# Patient Record
Sex: FEMALE | Race: WHITE
[De-identification: names, ages, dates, MRNs, and addresses within clinical notes are randomized per-mention and may not be internally consistent; named-entity substitution may affect disease eponyms.]

---

## 2019-11-11 ENCOUNTER — HOSPITAL ENCOUNTER (OUTPATIENT)
Dept: HOSPITAL 25 - ED | Age: 74
Setting detail: OBSERVATION
LOS: 2 days | Discharge: HOME | End: 2019-11-13
Attending: INTERNAL MEDICINE | Admitting: INTERNAL MEDICINE
Payer: COMMERCIAL

## 2019-11-11 DIAGNOSIS — M25.551: ICD-10-CM

## 2019-11-11 DIAGNOSIS — E03.9: ICD-10-CM

## 2019-11-11 DIAGNOSIS — Z88.2: ICD-10-CM

## 2019-11-11 DIAGNOSIS — E87.1: ICD-10-CM

## 2019-11-11 DIAGNOSIS — Z79.899: ICD-10-CM

## 2019-11-11 DIAGNOSIS — M85.80: ICD-10-CM

## 2019-11-11 DIAGNOSIS — Z79.01: ICD-10-CM

## 2019-11-11 DIAGNOSIS — Z96.641: ICD-10-CM
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
## 2019-11-11 DIAGNOSIS — S32.591A: Primary | ICD-10-CM

## 2019-11-11 DIAGNOSIS — Z86.711: ICD-10-CM

## 2019-11-11 DIAGNOSIS — F32.9: ICD-10-CM
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
## 2019-11-11 PROCEDURE — 96376 TX/PRO/DX INJ SAME DRUG ADON: CPT

## 2019-11-11 PROCEDURE — 85025 COMPLETE CBC W/AUTO DIFF WBC: CPT

## 2019-11-11 PROCEDURE — 96375 TX/PRO/DX INJ NEW DRUG ADDON: CPT

## 2019-11-11 PROCEDURE — 72170 X-RAY EXAM OF PELVIS: CPT

## 2019-11-11 PROCEDURE — 96374 THER/PROPH/DIAG INJ IV PUSH: CPT

## 2019-11-11 PROCEDURE — 36415 COLL VENOUS BLD VENIPUNCTURE: CPT

## 2019-11-11 PROCEDURE — 99284 EMERGENCY DEPT VISIT MOD MDM: CPT

## 2019-11-11 PROCEDURE — 96372 THER/PROPH/DIAG INJ SC/IM: CPT

## 2019-11-11 PROCEDURE — G0378 HOSPITAL OBSERVATION PER HR: HCPCS

## 2019-11-11 PROCEDURE — 80053 COMPREHEN METABOLIC PANEL: CPT

## 2019-11-11 RX ADMIN — IBUPROFEN SCH MG: 600 TABLET, FILM COATED ORAL at 18:55

## 2019-11-11 RX ADMIN — OXYCODONE HYDROCHLORIDE AND ACETAMINOPHEN PRN TAB: 5; 325 TABLET ORAL at 20:24

## 2019-11-11 RX ADMIN — HYDROMORPHONE HYDROCHLORIDE PRN MG: 1 INJECTION, SOLUTION INTRAMUSCULAR; INTRAVENOUS; SUBCUTANEOUS at 22:45

## 2019-11-11 RX ADMIN — ENOXAPARIN SODIUM SCH MG: 40 INJECTION SUBCUTANEOUS at 18:56

## 2019-11-11 NOTE — ED
Lower Extremity





- HPI Summary


HPI Summary: 





Pt is a 75 y/o F presenting to the ED with a chief complaint of R hip pain. Pt 

states she was in an exercise class when she fell and hurt her R hip. Notable 

hx of fracture to R hip, and R hip replacement done by Dr. Man in [place]. 

She currently reports pain. She denies fever.





- History of Current Complaint


Chief Complaint: EDExtremityLower


Stated Complaint: RT HIP PAIN AFTER FALL PER EMS


Time Seen by Provider: 11/11/19 10:30


Hx Obtained From: Patient


Mechanism Of Injury: Fall From A Standing Position


Onset of Pain: Immediate


Onset/Duration: Still Present


Severity Initially: Moderate


Severity Currently: Moderate


Pain Intensity: 6


Pain Scale Used: 0-10 Numeric


Timing: Constant, Lasting Hours


Location: Is Discrete @ - R hip


Associated Signs And Symptoms: Positive: Negative


Aggravating Factor(s): Movement


Alleviating Factor(s): Nothing


Able to Bear Weight: No





- Allergies/Home Medications


Allergies/Adverse Reactions: 


 Allergies











Allergy/AdvReac Type Severity Reaction Status Date / Time


 


Sulfa (Sulfonamide Allergy  Rash Verified 10/11/19 11:10





Antibiotics)     











Home Medications: 


 Home Medications





Cholecalciferol TAB* [Vitamin D TAB*] 1,000 unit PO DAILY 11/11/19 [History 

Confirmed 11/11/19]


Dextromethorphan Polistirex [Delsym] 30 mg PO DAILY PRN 11/11/19 [History 

Confirmed 11/11/19]


Phytonadione (Vit K1) [Superiorsource K1] 500 mcg PO DAILY 11/11/19 [History 

Confirmed 11/11/19]











PMH/Surg Hx/FS Hx/Imm Hx


Previously Healthy: Yes


Endocrine/Hematology History: Reports: Hx Thyroid Disease - hashimotos


   Denies: Hx Diabetes


Cardiovascular History: 


   Denies: Hx Hypertension, Hx Pacemaker/ICD, Other Cardiovascular Problems/

Disorders - DENIES


Respiratory History: 


   Denies: Hx Asthma, Hx Chronic Obstructive Pulmonary Disease (COPD), Other 

Respiratory Problems/Disorders - DENIES


GI History: 


   Denies: Hx Ulcer


 History: 


   Denies: Hx Renal Disease


Musculoskeletal History: 


   Denies: Hx Rheumatoid Arthritis, Hx Osteoporosis - OSTEOPENIA


Sensory History: 


   Denies: Hx Hearing Aid


Neurological History: Reports: Hx Migraine


Psychiatric History: 


   Denies: Hx Panic Disorder





- Cancer History


Hx Chemotherapy: No


Hx Radiation Therapy: No





- Surgical History


Surgery Procedure, Year, and Place: partial thyroidectomy 1995.  bone cyst 

removed x3.  rt hip replacement 3/11/16.  C SECTION X2.  BREAST BIOPSY


Infectious Disease History: No


Infectious Disease History: 


   Denies: Hx Hepatitis, Hx Human Immunodeficiency Virus (HIV), History Other 

Infectious Disease, Traveled Outside the [place] in Last 30 Days





- Family History


Known Family History: 


   Negative: Hypertension





- Social History


Alcohol Use: Daily


Alcohol Amount: 1 glass of wine - none since March r/t Xarelto


Hx Substance Use: No


Substance Use Type: Reports: None


Hx Tobacco Use: No


Smoking Status (MU): Never Smoked Tobacco





Review of Systems


Negative: Fever


Positive: Arthralgia - R hip, Other - fall


All Other Systems Reviewed And Are Negative: Yes





Physical Exam





- Summary


Physical Exam Summary: 





Appearance: The patient is well-nourished in no acute distress and in no acute 

pain.


 


Skin: The skin is warm and dry, and skin color reflects adequate perfusion.





HEENT: The head is normocephalic and atraumatic. The pupils are equal and 

reactive. The conjunctivae are clear and without drainage. Nares are patent and 

without drainage. Mouth reveals moist mucous membranes, and the throat is 

without erythema and exudate. The external ears are intact. The ear canals are 

patent and without drainage. The tympanic membranes are intact.


 


Neck: The neck is supple with full range of motion and non-tender. There are no 

carotid bruits. There is no neck vein distension.


 


Respiratory: Chest is non-tender. Lungs are clear to auscultation and breath 

sounds are symmetrical and equal.


 


Cardiovascular: Heart is regular rate and rhythm. There is no murmur or rub 

auscultated. There is no peripheral edema and pulses are symmetrical and equal.


 


Abdomen: The abdomen is soft and non-tender. There are normal bowel sounds 

heard in all four quadrants and there is no organomegaly palpated.


 


Musculoskeletal: There is no back tenderness noted. Tenderness to any ROM of R 

hip. There is good capillary refill. There is no peripheral edema or calf 

tenderness elicited.


 


Neurological: Patient is alert and oriented to person, place and time. The 

patient has symmetrical motor strength in all four extremities. Cranial nerves 

are grossly intact. Deep tendon reflexes are symmetrical and equal in all four 

extremities.


 


Psychiatric: The patient has an appropriate affect and does not exhibit any 

anxiety or depression.








Triage Information Reviewed: Yes


Vital Signs On Initial Exam: 


 Initial Vitals











Temp Pulse Resp BP Pulse Ox


 


 97.5 F   73   18   133/81   99 


 


 11/11/19 10:26  11/11/19 10:26  11/11/19 10:26  11/11/19 10:26  11/11/19 10:26











Vital Signs Reviewed: Yes





Procedures





- Sedation


Patient Received Moderate/Deep Sedation with Procedure: No





Diagnostics





- Vital Signs


 Vital Signs











  Temp Pulse Resp BP Pulse Ox


 


 11/11/19 10:26  97.5 F  73  18  133/81  99














- Laboratory


Lab Statement: Any lab studies that have been ordered have been reviewed, and 

results considered in the medical decision making process.





- Radiology


  ** Hip/Pelvis XR


Radiology Interpretation Completed By: Radiologist


Summary of Radiographic Findings: 1. COMMINUTED DISPLACED FRACTURE OF THE RIGHT 

SUPERIOR PUBIC RAMUS.  2. POSSIBLE NONDISPLACED FRACTURE OF THE RIGHT INFERIOR 

PUBIC RAMUS.  3. STATUS POST TOTAL RIGHT HIP REPLACEMENT SURGERY, NO EVIDENCE 

FOR PERIPROSTHETIC FRACTURE.  ED physician has reviewed this report.





Lower Extremity Course/Dx





- Course


Course Of Treatment: Ms. Geller was found to have a nondisplaced superior 

ramus fracture and a nondisplaced inferior ramus fracture of the right side of 

her pelvis.  In spite of parenteral pain medication I was unable to get her to 

ambulate and therefore contacted the hospitalist for admission for intractable 

pain.





- Diagnoses


Provider Diagnoses: 


 Pelvic fracture








Discharge ED





- Sign-Out/Discharge


Documenting (check all that apply): Patient Departure





- Discharge Plan


Condition: Stable


Disposition: ADMITTED TO [facility]





- Billing Disposition and Condition


Condition: STABLE


Disposition: Admitted to [facility]





- Attestation Statements


Document Initiated by Scribe: Yes


Documenting Scribe: Shae Hawthorne


Provider For Whom Joaquin is Documenting (Include Credential): Bruce Madera MD.


Scribe Attestation: 


I, Shae Hawthorne, scribed for Bruce Madera MD. on 11/11/19 at 2002. 


Scribe Documentation Reviewed: Yes


Provider Attestation: 


The documentation as recorded by the scribe, Shae Hawthorne accurately 

reflects the service I personally performed and the decisions made by me, 

Bruce Madera MD.


Status of Scribe Document: Viewed

## 2019-11-11 NOTE — XMS REPORT
Continuity of Care Document (CCD)

 Created on:2019



Patient:Melinda Geller

Sex:Female

:1945

External Reference #:[id number]





Demographics







 Address  [address] [place] [address]

 

 Home Phone  [phone]

 

 Email Address  [email or web address]

 

 Preferred Language  en

 

 Marital Status  Declined to Specify/Unknown

 

 Alevism Affiliation  Unknown

 

 Race  White

 

 Ethnic Group  Declined to Specify/Unknown









Author







 Name  Val Rodriguez, JOSE

 

 Address  [address] [place] [address]









Care Team Providers







 Name  Role  Phone

 

 Cryer, Jonathan MD - Otolaryngology  Care Team Information   [phone]
[phone]

 

 Collin Carbajal MD - Family Medicine  Care Team Information   [phone]
[phone]









Problems







 Active Problems  Provider  Date

 

 Depressive disorder  Collin Carbajal M.D.  Onset: 12/15/2011

 

 Backache  Collin Carbajal M.D.  Onset: 12/15/2011

 

 Spinal stenosis in cervical region  Collin Carbajal M.D.  Onset: 2012

 

 Osteochondropathy  Collin Carbajal M.D.  Onset: 2012

 

 Goiter  Collin Carbajal M.D.  Onset: 2013

 

 Non-toxic multinodular goiter  Collin Carbajal M.D.  Onset: 02/15/2016

 

 Degenerative joint disease involving  Collin Carbajal M.D.  Onset: 02/15/2016



 multiple joints    

 

 Skin sensation disturbance  Collin Carbajal M.D.  Onset: 2019

 

 H/O: pulmonary embolus  Collin Carbajal M.D.  Onset: 2019

 

 Headache  Collin Carbajal M.D.  Onset: 2019







Social History







 Type  Date  Description  Comments

 

 Birth Sex    Unknown  

 

 Tobacco Use  Start: Unknown  Never Smoked Cigarettes  

 

 ETOH Use    Rarely consumes alcohol  

 

 Tobacco Use  Start: Unknown  Patient has never smoked  

 

 Smoking Status  Reviewed: 10/19/19  Patient has never smoked  







Allergies, Adverse Reactions, Alerts







 Active Allergies  Reaction  Severity  Comments  Date

 

 Seasonal        2011

 

 Sulfa      sores in mouth  2016









 Inactive Allergies









 NKDA        2011







Medications







 Active Medications  SIG  Qnty  Indications  Ordering  Date



         Provider  

 

 Doxycycline  take one tablet by  20caps  J01.90  Val Garcia  10/19/2019



 Monohydrate  mouth twice a day.      JOSE Rodriguez  



            100mg          



 Capsules          



           

 

 Zofran  take one tablet as  30tabs  R11.0  Val Radha  10/19/2019



       4mg Tablets  needed every 8 hours      JOSE Rodriguez  



   for nausea        

 

 Shingrix  one injection, repeat  1units    Collin SANTIZO  2019



         50mcg/0.5ML  in 2-6 months      REJI Carbajal  



 Suspension Rec          



           

 

 Prolia  Inject 60MG  1units    Collin SANTIZO  2017



       60mg/ml Soln  Subcutaneously Every      REJI Carbajal  



 Prefill Syringe  6 Months (Given AT        



   Prescribers Office)        

 

 Fluoxetine HCL  Take 1 Tablet By  90tabs    Collin SANTIZO  2016



               10mg  Mouth  Every Day      REJI Carbajal  



 Tablets          



           

 

 Triamcinolone  apply daily as needed  45gm    Collin SANTIZO  02/15/2016



 Acetonide        REJI Carbajal  



          0.1% Cream          



           

 

 Vitamin C  qd      Collin FRiddhi  2014



         REJI Carbajal  

 

 Vitamin B Complex  1 po qd      Unknown  



           



 Tablets          



           

 

 Vitamin D  2 by mouth every day      Unknown  



          1000Unit          



 Tablets          



           

 

 Ferrous Gluconate  1 by mouth every day      Unknown  



           



 225(27Fe) mg Tablets          



           

 

 Vitamin K        Unknown  



 (Phytonadione)          



               100mcg          



 Tablets          



           

 

 Calcium 600  1po every day      Unknown  



            600mg          



 Tablets          



           







Medications Administered in Office







 Medication  SIG  Qnty  Indications  Ordering Provider  Date

 

 Injection Subcutaneous Or        Collin Carbajal M.D.  2018



 Intramuscular          



     Injection          

 

 Injection Subcutaneous Or        Collin Carbajal M.D.  2018



 Intramuscular          



     Injection          

 

 Injection Subcutaneous Or        Collin Carbajal M.D.  2018



 Intramuscular          



     Injection          







Immunizations







 CPT Code  Status  Date  Vaccine  Reaction  Lot #

 

 [id number]  Given  09/10/2019  Zoster (Shingles) Vaccine (HZV),    [id number]



       Recombinant, Subunit, Adjuvanted    

 

 53441  Given  2016  Hep A Adlt Immunization    [id number]

 

 96575  Given  08/10/2015  Pneumococcal Conjugate Vacc-13    [id number]

 

 37232  Given  2013  DO Not Use Split Influenza Virus    



       Vaccine    

 

 58384  Given  2012  Pneumococcal Immunization  no reaction noted  [id number]

 

 22174  Given  2012  Tdap Tetanus, W Pertussis  no reaction noted  [id number]

 

 22552  Given  2009  DO Not Use Split Influenza Virus    [id number]



       Vaccine    

 

 27347  Given  2008  Tetanus And Diptheria Adult    [id number]



       Preservative Free >7Yrs    

 

 28209  Given  2007  Zostivax    1212F

 

 15670  Given  1998  Td Immunization, For Use In    



       Individuals 7 Years Or Older    

 

 80588  Given  10/27/1997  Influenza Immunization    







Vital Signs







 Date  Vital  Result  Comment

 

 10/19/2019 12:03pm  BP Systolic  100 mmHg  









 BP Diastolic  66 mmHg  

 

 Heart Rate  104 /min  

 

 Body Temperature  98.5 F  

 

 Respiratory Rate  16 /min  

 

 O2 % BldC Oximetry  95 %  Ra

 

 Height  66 inches  5'6" measured 19

 

 Weight  145.00 lb  

 

 BMI (Body Mass Index)  23.4 kg/m2  









 2019 10:25am  BP Systolic  98 mmHg  









 BP Diastolic  60 mmHg  

 

 Heart Rate  62 /min  

 

 Body Temperature  97.9 F  

 

 Respiratory Rate  16 /min  

 

 Height  66 inches  5'6" measured 19

 

 Weight  145.00 lb  

 

 BMI (Body Mass Index)  23.4 kg/m2  







Results







 Test  Date  Facility  Test  Result  H/L  Range  Note

 

 Ict-Hemoccult  2019  [facility]  Ict Hemoccult  19 Neg.      



 ([facility]    (607)-   -  (1)        



 Screeni              









 Ict Hemoccult-(2)  19 Neg.      

 

 Ict-Hemoccult (3)  19 Neg.      









 Laboratory test  2019  [facility]  C-Reactive  0.8 mg/L    0.0-4.9  1



 finding    [address]  Protein, [place]        



     [place] [address]          



     (607)-   -          

 

 Ua - Micro ([facility])  2019  [facility]  Appearance  Clear      



     (607)-   -          









 Color  Yellow      

 

 Glucose, Urine ([facility]/CTX)  Negative      

 

 Bilirubin  Negative      

 

 Ketones  Negative      

 

 SP Grav  1.010      

 

 Blood  Moderate      

 

 PH  7.0      

 

 Protein  Negative      

 

 Urobil  0.2      

 

 Nitrite  Negative      

 

 Leukocytes ([facility]rex)  Small      

 

 Hyaline  - /Lpf      

 

 Granular  - /Lpf      

 

 WBC ([facility])  5-10      

 

 RBC  3-5      

 

 Mucus ([facility]/CBC/Centrex)  - /Lpf      

 

 Epith  Rare /Lpf      

 

 Bacteria  Rare /Hpf      

 

 Amorphous ([facility]rex)  - /Lpf      

 

 Crystals, Fluid ([facility]/CTX)  -      

 

 Z#Comments  -      









 1  1 gold top SST







Procedures







 Date  Code  Description  Status

 

 2019  58160  Electrocardiogram Complete  Completed

 

 2019  49134401  Mammogram  Completed

 

 2018  18581707  Mammogram  Completed

 

 2017  540659861  Bone Mineral Density Test  Completed

 

 2017  90715849  Mammogram  Completed

 

 2016  94351026  Mammogram  Completed

 

 2015  80593644  Mammogram  Completed

 

 2014  21473714  Colonoscopy  Completed

 

 2014  83059054  Mammogram  Completed

 

 2012  70331841  Mammogram  Completed

 

 10/20/2011  72722496  Mammogram  Completed

 

 2010  01611901  Mammogram  Completed

 

 2009  40154470  Mammogram  Completed

 

 2008  40933109  Mammogram  Completed

 

 2007  02208271  Mammogram  Completed

 

 2006  12556800  Mammogram  Completed







Medical Devices







 Description

 

 No Information Available







Encounters







 Type  Date  Location  Provider  Dx  Diagnosis

 

 Office Visit  2019 10:20a  [facility] Office  Collin Carbajal M.D.  R51
  Headache









 R20.1  Hypoesthesia of skin

 

 M85.9  Disorder of bone density and structure, unspecified







Assessments







 Date  Code  Description  Provider

 

 10/19/2019  R11.0  Nausea  Val Rodriguez, JOSE

 

 10/19/2019  J01.90  Acute sinusitis, unspecified  Val Rodriguez NP

 

 09/10/2019  Z23  Encounter for immunization  Collin Carbajal M.D.

 

 2019  R51  Headache  Collin Carbajal M.D.

 

 2019  R20.1  Hypoesthesia of skin  Collin Carbajal M.D.

 

 2019  M85.9  Disorder of bone density and structure,  Collin Carbajal M.D.



     unspecified  

 

 2019  Z12.11  Encounter for screening for malignant  Collin Carbajal M.D.



     neoplasm of colon  

 

 2019  Z12.12  Encounter for screening for malignant  Collin Carbajal M.D.



     neoplasm of rectum  

 

 2019  R51  Headache  Collin Carbajal M.D.

 

 2019  M85.9  Disorder of bone density and structure,  Collin Carbajal M.D.



     unspecified  

 

 2019  E04.2  Nontoxic multinodular goiter  Collin Carbajal M.D.

 

 2019  Z86.711  Personal history of pulmonary embolism  Collin Carbajal M.D.

 

 2019  F32.89  Other specified depressive episodes  Collin Carbajal M.D.

 

 2019  Z00.00  Encounter for general adult medical  Collin Carbajal M.D.



     examination without abno  







Plan of Treatment

10/19/2019 - Val Rodriguez, NPR11.0 NauseaNew Medication:Zofran 4 mg - 
take one tablet as needed every 8 hours for nauseaComments:bland diet slow PO 
fluids mrwjgrgbkK60.90 Acute sinusitis, unspecifiedNew Medication:Doxycycline 
Monohydrate 100 mg - take one tablet by mouth twice a day.Comments:Supportive 
care: 1) Make sure you are resting. This is the only way the body can take the 
energy it needs to heal itself. 2) Fluids, fluids, fluids! - Drink a lot of 
water or other caffeine free, clearliquids - Use a humidifier in your room at 
night or perform steam inhalations (20-30 mins) three times a day- If tolerated
, use a saline nasal spray to help clear out your sinuses 3) Cough and blow it 
out, the more you can get out of your system the better4) For throat pain: try 
using an adult dose ofchildren's Tylenol to help coat your throat and give you 
some pain relief. 5) Sleep with the head ofthe bed up6) Avoid cigarette smoke, 
caffeine, alcoholIf you are not improving or begin to get worse,please contact 
the office to be seen again.AllComments:Medication Management Patient 
Understands medications  he 's taking?     Yes    No  Are there Barriers to 
Adherence?    Yes    No  Has the patient been asked about herbal supplements 
and therapies, andOTC  meds?      Yes    No      Care Plan1.  Patient has been 
queried about patient's goals/preferences and functional/lifestyle goals at 
relevant visits.  If relevant, describe: na2.  Treatment goals as explained to 
the patient: above3.  Are there barriers to meeting treatment goals?  Yes    No
      If Yes, please describe: disease process, comorbid conditions 4.  Self-
Management goals as described to the patient:  Yes     NoAs always, we strongly 
encourage a healthy diet and making physical activity a part of your every day 
life. If you have questions about how or where to start, please contact the 
office.



Functional Status







 Description

 

 No Information Available







Mental Status







 Description

 

 No Information Available







Referrals







 Description

 

 No Information Available

## 2019-11-11 NOTE — HP
History of Present Illness





- History of Present Illness


Reason for Visit: Pain after Fall


History of Present Illness: 





This is 73 y/o F with history of provoked PE and Osteopenia presented with Pain 

in her right hip that started after she fell down this morning while 

exercising. She was apparently well until this morning while she was exercising 

and doing warm up and her legs jammed and she felt down on her right side and 

since then she is reporting of right hip pain that is acute in onset, 

progressive, 10/10 when it started and deep aching type. It was increased by 

movement and decreased by rest and medication. It is associated with decreased 

mobility of her right hip. She denies trauma to other site, bleeding and head 

trauma. She denies loss of conscious, numbness, tingling and weakness and 

incontinence. She denies fever, chills and rigor. She denies history of low 

trauma fracture in past, kidney stones or family history of kidney stones. She 

does report of losing 1 inch of her height. Because of progressive pain it 

prompted her to come to ED.





In ED


Her vitals were stable but she was having worsening pain. She was given Pain 

meds and pelvic Xray was done which showed displaced fracture of right superior 

pubic ramus and posible nondisplaced fracture of right inferior pubic ramus; s/

p total right hip replacement. Her pain improved and she was planned to 

discharge but then her pain increased on movement and had lightheadedness, 

dizziness and nausea so she was admitted for management of Pain and fracture.





- Past Medical History


Past Medical History: 





1. Pulmonary Embolism- Provoked after hip replacement surgery 3 years ago; 

completed 6 month course of Xarelto


2. Osteopenia- Next DEXA scan in December


3. Depression





- Past Surgical History


Past Surgical History: 





1. Right hip replacement-03/11/2016


2. Partial Thyroidectomy


3. Left 5th finger fracture- 8 years ago.





- Past Family History


Past Family History: 





Has 2 children and both of them are healthy. 





- Past Social History


Past Social History: 





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 She drinks 1 glass 

of wine/beer per day frequently and denies smoking and drug use. Her PCP is 

Collin Carbajal. Her HCP is Regalado Ferdinand, her ([phone]) and she is full 

code.





Review of Systems





- Review of Systems


Constitutional: Negative: Fever, Chills, Sweats, Weakness, Malaise, Other


Eyes: Negative: Pain, Vision Change, Conjunctivae Inflammation, Eyelid 

Inflammation, Redness, Other


ENT: Positive: Nose Congestion.  Negative: Ear Pain, Ear Discharge, Nose Pain, 

Nose Discharge, Mouth Pain, Mouth Swelling, Throat Pain, Throat Swelling, Other


Respiratory: Negative: Cough, Dry, Shortness of Breath, Hemoptysis, SOB with 

Excertion, Pleuritic Pain, Sputum, Wheezing


Cardiovascular: Negative: Chest Pain, Palpitations, Orthopnea, Paroxysmal Noc. 

Dyspnea, Edema, Light Headedness, Other


Gastrointestinal: Negative: Nausea, Vomiting, Abdominal Pain, Diarrhea, 

Constipation, Melena, Hematochezia, Other


Genitourinary: Negative: Dysuria, Frequency, Incontinence, Hematuria, Retention

, Other


Musculoskeletal: Positive: Other - hip pain.  Negative: Neck Pain, Shoulder Pain

, Arm Pain, Back Pain, Hand Pain, Leg Pain, Foot Pain


Skin: Negative: Rash, Lesions, Jose J, Bruising, Other


Neurological: Negative: Weakness, Numbness, Incoordination, Change in Speech, 

Confusion, Seizures, Other





- Medications/Allergies


Allergies/Adverse Reactions: 


 Allergies











Allergy/AdvReac Type Severity Reaction Status Date / Time


 


Sulfa (Sulfonamide Allergy  Rash Verified 10/11/19 11:10





Antibiotics)     











Medications: 


 Current Medications





Ascorbic Acid (Vitamin C  Tab*)  500 mg PO DAILY [facility]


Cholecalciferol (Vitamin D Tab*)  1,000 units PO DAILY [facility]


Enoxaparin Sodium (Lovenox(*))  40 mg SUBCUT Q24H [facility]


Fluoxetine HCl (Fluoxetine Hcl)  10 mg PO BEDTIME [facility]


Hydromorphone HCl (Dilaudid Inj*)  0.5 mg IV Q4H PRN


   PRN Reason: PAIN - SEVERE


Ibuprofen (Motrin Tab*)  600 mg PO Q6H [facility]


Non-Formulary Medication (Calcium Carbonate/Vitamin D3 [Calcium 500 + Vit D 

Caplet])  1 tab PO DAILY [facility]


Non-Formulary Medication (Ferrous Gluconate [Ferrous Gluconate])  240 mg PO 

DAILY [facility]


Non-Formulary Medication (Phytonadione (Vit K1) [Vitamin K-1])  500 mcg PO 

DAILY [facility]


Oxycodone/Acetaminophen (Percocet 5/325 Tab*)  1 tab PO Q4H PRN


   PRN Reason: PAIN - MODERATE











Exam


Vital Signs: 


 Vital Signs (72 hours)











  11/11/19 11/11/19 11/11/19





  10:26 10:28 10:56


 


Temperature 97.5 F  


 


Pulse Rate 71 69 68


 


Respiratory 18  





Rate   


 


Blood Pressure 133/81  156/78





(mmHg)   


 


O2 Sat by Pulse 99 99 99





Oximetry   














  11/11/19 11/11/19 11/11/19





  11:00 11:56 12:00


 


Temperature   


 


Pulse Rate 65 70 67


 


Respiratory   





Rate   


 


Blood Pressure  142/70 





(mmHg)   


 


O2 Sat by Pulse 99 98 97





Oximetry   














  11/11/19 11/11/19 11/11/19





  12:26 12:49 12:56


 


Temperature   


 


Pulse Rate 75  69


 


Respiratory  17 





Rate   


 


Blood Pressure 128/69  149/70





(mmHg)   


 


O2 Sat by Pulse 98  97





Oximetry   














  11/11/19 11/11/19 11/11/19





  13:00 14:14 14:59


 


Temperature   


 


Pulse Rate 70  


 


Respiratory  18 





Rate   


 


Blood Pressure   97/54





(mmHg)   


 


O2 Sat by Pulse 98  





Oximetry   














  11/11/19 11/11/19 11/11/19





  15:26 15:56 16:26


 


Temperature   


 


Pulse Rate   


 


Respiratory   





Rate   


 


Blood Pressure 117/60 125/61 133/64





(mmHg)   


 


O2 Sat by Pulse   





Oximetry   














  11/11/19





  16:56


 


Temperature 


 


Pulse Rate 


 


Respiratory 





Rate 


 


Blood Pressure 123/62





(mmHg) 


 


O2 Sat by Pulse 





Oximetry 











Exam: 





Patient is lying on a bed in supine position and is not in acute dstress.


HEENT: nOrmocephalic and atraumatic. Sclera anicteric. EOMI. PERRLA.


Neck: No lymphadenopathy and enlarged thyroid. NO JVD elevation.


Lungs: Clear with no added sound.


Heart: S1/S2 heard with no murmur.


Abdomen: Soft, nondistended and nontender. Normal BS heard.


Extremities; NO evidence of bleeding or bruising. Active ROM decreased by pain 

on right hip.


Neuro: Alert, oriented and coperative. CN intact. Motor nomral and sensation 

intact.





Assessment/Plan





- Assessment/Plan


Assessment: 





73 y/o F with h/o Right hip replacement and provoked PE presented with right 

hip pain after mechanical fall. She is found to have displaced fracture of 

right superior pubic ramus. Pain controlled on Ibuprofen, percocet and 

hydromorphone.





Plan: 





1. Right Hip Pain- From pubic fracture. Could be also be from ligament sprain 

or muscle strain. We will control her pain with NSAIDS, percocet and 

hydromorphone. WE will consult Orthopedic for pelvic fracture and also for 

prosthesis check. We will keep her on bed rest and ask PT to evaluate her.





2. Osteopenia- Her last DEXA scan showed osteopenia and is on Vitamin D and 

calcium. He rnect DEXA is scheduled on december.





3. Depression- COntinue Fluoxetine. No active issue.





4. DVT prophylaxis- On lovenox





5. Diet- Regular unrestricted diet.





6 Full Code

## 2019-11-12 LAB
ALBUMIN SERPL BCG-MCNC: 3.9 G/DL (ref 3.2–5.2)
ALBUMIN/GLOB SERPL: 1.6 {RATIO} (ref 1–3)
ALP SERPL-CCNC: 49 U/L (ref 34–104)
ALT SERPL W P-5'-P-CCNC: 17 U/L (ref 7–52)
ANION GAP SERPL CALC-SCNC: 4 MMOL/L (ref 2–11)
AST SERPL-CCNC: 26 U/L (ref 13–39)
BASOPHILS # BLD AUTO: 0 10^3/UL (ref 0–0.2)
BUN SERPL-MCNC: 12 MG/DL (ref 6–24)
BUN/CREAT SERPL: 20.3 (ref 8–20)
CALCIUM SERPL-MCNC: 9 MG/DL (ref 8.6–10.3)
CHLORIDE SERPL-SCNC: 94 MMOL/L (ref 101–111)
EOSINOPHIL # BLD AUTO: 0.1 10^3/UL (ref 0–0.6)
GLOBULIN SER CALC-MCNC: 2.5 G/DL (ref 2–4)
GLUCOSE SERPL-MCNC: 100 MG/DL (ref 70–100)
HCO3 SERPL-SCNC: 30 MMOL/L (ref 22–32)
HCT VFR BLD AUTO: 35 % (ref 35–47)
HGB BLD-MCNC: 11.9 G/DL (ref 12–16)
LYMPHOCYTES # BLD AUTO: 0.8 10^3/UL (ref 1–4.8)
MCH RBC QN AUTO: 33 PG (ref 27–31)
MCHC RBC AUTO-ENTMCNC: 34 G/DL (ref 31–36)
MCV RBC AUTO: 97 FL (ref 80–97)
MONOCYTES # BLD AUTO: 0.5 10^3/UL (ref 0–0.8)
NEUTROPHILS # BLD AUTO: 4.3 10^3/UL (ref 1.5–7.7)
NRBC # BLD AUTO: 0 10^3/UL
NRBC BLD QL AUTO: 0
PLATELET # BLD AUTO: 208 10^3/UL (ref 150–450)
POTASSIUM SERPL-SCNC: 3.6 MMOL/L (ref 3.5–5)
PROT SERPL-MCNC: 6.4 G/DL (ref 6.4–8.9)
RBC # BLD AUTO: 3.59 10^6 /UL (ref 3.7–4.87)
SODIUM SERPL-SCNC: 128 MMOL/L (ref 135–145)
WBC # BLD AUTO: 5.8 10^3/UL (ref 3.5–10.8)

## 2019-11-12 RX ADMIN — OXYCODONE HYDROCHLORIDE AND ACETAMINOPHEN SCH MG: 500 TABLET ORAL at 09:50

## 2019-11-12 RX ADMIN — Medication SCH UNITS: at 09:50

## 2019-11-12 RX ADMIN — IBUPROFEN SCH: 600 TABLET, FILM COATED ORAL at 00:34

## 2019-11-12 RX ADMIN — ENOXAPARIN SODIUM SCH MG: 40 INJECTION SUBCUTANEOUS at 17:23

## 2019-11-12 RX ADMIN — Medication SCH TAB: at 09:50

## 2019-11-12 RX ADMIN — IBUPROFEN SCH: 600 TABLET, FILM COATED ORAL at 12:20

## 2019-11-12 RX ADMIN — HYDROMORPHONE HYDROCHLORIDE PRN MG: 1 INJECTION, SOLUTION INTRAMUSCULAR; INTRAVENOUS; SUBCUTANEOUS at 03:06

## 2019-11-12 RX ADMIN — ONDANSETRON PRN MG: 2 INJECTION INTRAMUSCULAR; INTRAVENOUS at 13:09

## 2019-11-12 RX ADMIN — HYDROMORPHONE HYDROCHLORIDE PRN MG: 1 INJECTION, SOLUTION INTRAMUSCULAR; INTRAVENOUS; SUBCUTANEOUS at 07:03

## 2019-11-12 RX ADMIN — Medication SCH: at 08:51

## 2019-11-12 RX ADMIN — NAPROXEN SCH MG: 250 TABLET ORAL at 20:40

## 2019-11-12 RX ADMIN — FLUOXETINE SCH MG: 10 CAPSULE ORAL at 08:50

## 2019-11-12 RX ADMIN — OXYCODONE HYDROCHLORIDE AND ACETAMINOPHEN PRN TAB: 5; 325 TABLET ORAL at 23:31

## 2019-11-12 RX ADMIN — CYCLOBENZAPRINE HYDROCHLORIDE PRN MG: 10 TABLET, FILM COATED ORAL at 23:02

## 2019-11-12 RX ADMIN — HYDROMORPHONE HYDROCHLORIDE PRN MG: 1 INJECTION, SOLUTION INTRAMUSCULAR; INTRAVENOUS; SUBCUTANEOUS at 11:14

## 2019-11-12 RX ADMIN — IBUPROFEN SCH: 600 TABLET, FILM COATED ORAL at 07:52

## 2019-11-12 RX ADMIN — OXYCODONE HYDROCHLORIDE AND ACETAMINOPHEN PRN TAB: 5; 325 TABLET ORAL at 09:50

## 2019-11-12 RX ADMIN — ONDANSETRON PRN MG: 2 INJECTION INTRAMUSCULAR; INTRAVENOUS at 21:40

## 2019-11-12 NOTE — HP
H&P (Free Text)


History and Physical: 


Orthopedic Surgery Consultation H&P


Date: 11/12/19


Requesting Service: ER, Dr. Madera





Chief Complaint: Right hip/pelvic pain.   





History:  A 74-year-old female who was in a cardio class and sustained a 

mechanical fall onto her right side.  She had immediate pain in the right hip/

pelvic region.  Denies pain anywhere else.  Denies head strike or LOC.  She 

reports that she had a right total hip replacement about 3 years ago and has 

had no problems with that since then.  She does have a history of a inferior 

pubic ramus fracture.  At the time of the total hip replacement.





The pain is located at the right hip/pelvic area and is constant, mild, mild, 

sharp. Pain worse with weightbearing and lessened when rested. 


   


Review of Systems: Negative for fever, recent visual changes, difficulty 

swallowing, chest pain, shortness of breath, abdominal pain, hematuria, easy 

bruising, diffuse weakness or lack of coordination, and diffuse rash. 





PMH: Pulmonary embolism after hip replacement, osteopenia, depression





PSH: Right total hip replacement in 2016, partial thyroidectomy, left hand 

surgery





Medications: Fluoxetine, vitamins





Allergies: Sulfa drugs





SH: She is retired.  She lives with her .  One alcoholic drink per day.  

No tobacco use.





FH: Noncontributory





Physical Examination:


Constitutional: 


 








Temp Pulse Resp BP Pulse Ox


 


 97.9 F   73   16   121/63   91 


 


 11/12/19 11:10  11/12/19 11:10  11/12/19 11:14  11/12/19 11:10  11/12/19 11:10








General appearance is healthy and non-septic in no acute distress.





Cardiovascular: 


Pulse examination demonstrates positive pedal pulses with brisk capillary 

refill. There are no varicosities.





Abdomen:


Soft and nontender





Lymphatic: 


No lymphadenopathy appreciated.





Skin: 


Bilateral upper and lower extremity examination demonstrates no ulcerative 

lesions.





Psychiatric / Neurological: 


Appropriate affect. Alert and oriented to person, place and time. There is no 

significant abnormality in coordination appreciated. Normoreflexive deep tendon 

reflex of the affected extremity.





Musculoskeletal: 


Bilateral upper extremities and contralateral lower extremity show full range 

of motion with no evidence of instability and no tenderness with palpation and 5

/5 strength. There is no gross deformity.


 


Right lower extremity: 


Skin intact


Tenderness to palpation about the anterior pubis.  No tenderness about the 

lateral hip.


Painless hip, knee and ankle range of motion.


5/5 motor strength distally with intact sensation to light touch


There is no global swelling, edema, or varicosities. 


No pain with logroll and heel strike


No TTP at knee, lower leg, ankle, foot


Palpable DP pulse.


Flexes and extends ankle and toes.





Imaging: X-rays were obtained, and independently interpreted and show a right 

pubic rami fractures





Labs: WBC 5.8


HCT 35


Platelets 208


Cr 0.59





Impression and Plan: A 74-year-old female status post mechanical fall with right

-sided pubic rami fractures.  There is no evidence of problem with her right 

total hip replacement.  I did discuss the diagnosis and treatment with her and 

her .  The plan will be mobilization with PT, weightbearing as 

tolerated.  Once ambulating, please repeat AP pelvis x-ray to confirm no 

displacement.  Recommend DVT prophylaxis, given her history of a PE.  She can 

followup in 2 weeks in orthopedic clinic for repeat evaluation.  All questions 

were answered.





Giorgio Mane MD

## 2019-11-12 NOTE — PN
Subjective


Date of Service: 11/12/19


Interval History: 





HD 2 on 11/12





73 y/o F with h/o Right hip replacement and provoked PE presented with right 

hip pain after mechanical fall. She is found to have displaced fracture of 

right superior pubic ramus. Pain controlled on Ibuprofen, percocet and 

hydromorphone.





Overnight- no acute events


VS stable





Pain on right hip 2/10 while resting and 6-8/10 while moving


Denies numbness and tingling


Had episode of vomiting








Objective


Active Medications: 








Ascorbic Acid (Vitamin C  Tab*)  [facility] mg PO [facility]ILY [facility]


Calcium/Vitamin D (Oscal D Tab 250/125*)  1 tab PO DAILY [facility]


Cholecalciferol (Vitamin D Tab*)  1,000 units PO DAILY [facility]


Enoxaparin Sodium (Lovenox(*))  40 mg SUBCUT Q24H [facility]


   Last Admin: 11/11/19 18:56 Dose:  40 mg


Fluoxetine HCl (Prozac Cap*)  10 mg PO 0900 [facility]


Hydromorphone HCl (Dilaudid Inj*)  0.5 mg IV Q4H PRN


   PRN Reason: PAIN - SEVERE


   Last Admin: 11/12/19 03:06 Dose:  0.5 mg


Ibuprofen (Motrin Tab*)  600 mg PO Q6H [facility]


   Last Admin: 11/12/19 00:34 Dose:  Not Given


Nf:(Ferrous Gluconate [Ferrous Gluconate] 240 Mg)  240 mg PO DAILY [facility]


Oxycodone/Acetaminophen (Percocet 5/325 Tab*)  1 tab PO Q4H PRN


   PRN Reason: PAIN - MODERATE


   Last Admin: 11/11/19 20:24 Dose:  1 tab








 Vital Signs - 8 hr











  11/11/19 11/11/19 11/11/19





  22:42 22:45 23:45


 


Temperature 98.3 F  


 


Pulse Rate 74  


 


Respiratory 20 24 16





Rate   


 


Blood Pressure 116/50  





(mmHg)   


 


O2 Sat by Pulse 93  





Oximetry   














  11/12/19 11/12/19 11/12/19





  03:06 03:40 04:06


 


Temperature  98.1 F 


 


Pulse Rate  70 


 


Respiratory 14 20 20





Rate   


 


Blood Pressure  120/67 





(mmHg)   


 


O2 Sat by Pulse  93 





Oximetry   











Oxygen Devices in Use Now: None


Exam: 





Patient is lying on a bed in supine position and is not in acute dstress.


HEENT: nOrmocephalic and atraumatic. Sclera anicteric. EOMI. PERRLA.


Neck: No lymphadenopathy and enlarged thyroid. NO JVD elevation.


Lungs: Clear with no added sound.


Heart: S1/S2 heard with no murmur.


Abdomen: Soft, nondistended and nontender. Normal BS heard.


Extremities; NO evidence of bleeding or bruising. Active ROM decreased by pain 

on right hip.


Neuro: Alert, oriented and coperative. CN intact. Motor nomral and sensation 

intact.


Result Diagrams: 


 11/12/19 08:16





 11/12/19 08:16





Assess/Plan/Problems-Billing


Assessment: 





73 y/o F with h/o Right hip replacement and provoked PE presented with right 

hip pain after mechanical fall. She is found to have displaced fracture of 

right superior pubic ramus. Pain controlled on Ibuprofen, percocet and 

hydromorphone.








- Patient Problems


(1) Pubic ramus fracture


Current Visit: Yes   Status: Acute   Code(s): S32.599A - OTH FRACTURE OF UNSP 

PUBIS, INIT ENCNTR FOR CLOSED FRACTURE   SNOMED Code(s): 15319108


   Comment: 


-new superior ramus # on right after low trauma fall


-old inferior ramus #- during hip replacement surgery


-ortho consulation- recommens ambulation with PT and repeat pelvic Xray


-Ambulated with PT


-on naproxen and percocet


   





(2) Osteopenia


Current Visit: Yes   Status: Acute   Code(s): M85.80 - OTH DISRD OF BONE 

DENSITY AND STRUCTURE, UNSPECIFIED SITE   SNOMED Code(s): 726588607


   Comment: 


-On vitamin D and calcium


-DEXA on december   





(3) Depression


Current Visit: Yes   Status: Acute   Code(s): F32.9 - MAJOR DEPRESSIVE DISORDER

, SINGLE EPISODE, UNSPECIFIED   SNOMED Code(s): 24519984


   Comment: 


Continue fluoxetine


-no active issues   





(4) DVT prophylaxis


Current Visit: Yes   Status: Acute   Code(s): Z29.9 - ENCOUNTER FOR 

PROPHYLACTIC MEASURES, UNSPECIFIED   SNOMED Code(s): 398704698


   Comment: 


-on lovenox   





(5) Full code status


Current Visit: Yes   Status: Acute   Code(s): Z78.9 - OTHER SPECIFIED HEALTH 

STATUS   SNOMED Code(s): 367829716


   


Status and Disposition: 





Observation


dc tomorrow


Attending: Padmini Govea





Attestation


Documenting Resident: Marcos Poe


Supervising Physician: Padmini Govea


Attending/Supervising Physician Comment: 


attending assessment and plan:


74F PMH R THR 3 yr ago, osteopenia, mild anxiety presented s/p mech fall with 

superior ramus frxr. Admitted for pain control. Hospital course c/b side effect 

from opiate pain medicatoin


Plan


#Fracture: Was weight bearing today, optimize pain control on NSAID PRN percocet

, stop IV pain meds


-Continue PT


-Repeat Pelvic XR 11/13


#Osteopenia: Has DEXA scheduled in Dec 


#THR R: No e/o hardware fracture


#DVT: PPX with Lovenox


Dispo-Home vs [facility] depending on pt progress, OBS status


Attestation: 


This service has been performed in part by a resident under the direction of a 

teaching physician.I, Padmini Govea, performed the service, or was physically 

present during the critical, or key portions of the service, furnished by the 

resident. I participated in the management of the patient.

## 2019-11-13 VITALS — SYSTOLIC BLOOD PRESSURE: 92 MMHG | DIASTOLIC BLOOD PRESSURE: 69 MMHG

## 2019-11-13 RX ADMIN — FLUOXETINE SCH MG: 10 CAPSULE ORAL at 10:03

## 2019-11-13 RX ADMIN — NAPROXEN SCH MG: 250 TABLET ORAL at 10:03

## 2019-11-13 RX ADMIN — Medication SCH: at 09:59

## 2019-11-13 RX ADMIN — OXYCODONE HYDROCHLORIDE AND ACETAMINOPHEN SCH MG: 500 TABLET ORAL at 10:03

## 2019-11-13 RX ADMIN — OXYCODONE HYDROCHLORIDE AND ACETAMINOPHEN PRN TAB: 5; 325 TABLET ORAL at 13:00

## 2019-11-13 RX ADMIN — OXYCODONE HYDROCHLORIDE AND ACETAMINOPHEN PRN TAB: 5; 325 TABLET ORAL at 10:04

## 2019-11-13 RX ADMIN — Medication SCH TAB: at 10:03

## 2019-11-13 RX ADMIN — CYCLOBENZAPRINE HYDROCHLORIDE PRN MG: 10 TABLET, FILM COATED ORAL at 15:02

## 2019-11-13 RX ADMIN — Medication SCH UNITS: at 10:03

## 2019-11-13 NOTE — PN
Subjective


Date of Service: 11/13/19


Interval History: 








HD 3 on 11/13





75 y/o F with h/o Right hip replacement and provoked PE presented with right 

hip pain after mechanical fall. She is found to have displaced fracture of 

right superior pubic ramus. Pain controlled on Naproxen and percocet





Overnight- urine retention- seo inserted


Vital stable





Feeling good; discomfort on right hip while resting and pain 8/10 on ambulation


She ambulated well today 


Has some nausea but denies vomiting, fever.








Objective


Active Medications: 








Ascorbic Acid (Vitamin C  Tab*)  500 mg PO DAILY [facility]


   Last Admin: 11/12/19 09:50 Dose:  500 mg


Calcium/Vitamin D (Oscal D Tab 250/125*)  1 tab PO DAILY [facility]


   Last Admin: 11/12/19 09:50 Dose:  1 tab


Cholecalciferol (Vitamin D Tab*)  1,000 units PO DAILY [facility]


   Last Admin: 11/12/19 09:50 Dose:  1,000 units


Cyclobenzaprine HCl (Flexeril Tab*)  10 mg PO BID PRN


   PRN Reason: SPASMS


   Last Admin: 11/12/19 23:02 Dose:  10 mg


Enoxaparin Sodium (Lovenox(*))  40 mg SUBCUT Q24H [facility]


   Last Admin: 11/12/19 17:23 Dose:  40 mg


Fluoxetine HCl (Prozac Cap*)  10 mg PO 0900 [facility]


   Last Admin: 11/12/19 08:50 Dose:  10 mg


Naproxen (Naprosyn Tab*)  500 mg PO 0900,2100 [facility]


   Last Admin: 11/12/19 20:40 Dose:  500 mg


Nf:(Ferrous Gluconate [Ferrous Gluconate] 240 Mg)  240 mg PO DAILY [facility]


   Last Admin: 11/12/19 08:51 Dose:  Not Given


Ondansetron HCl (Zofran Inj*)  4 mg IV Q6H PRN


   PRN Reason: NAUSEA


   Last Admin: 11/12/19 21:40 Dose:  4 mg


Oxycodone/Acetaminophen (Percocet 5/325 Tab*)  1 tab PO Q4H PRN


   PRN Reason: PAIN - MODERATE


   Last Admin: 11/12/19 23:31 Dose:  1 tab








 Vital Signs - 8 hr











  11/12/19 11/12/19 11/12/19





  23:02 23:23 23:31


 


Temperature  98.3 F 


 


Pulse Rate  77 


 


Respiratory 18 18 18





Rate   


 


Blood Pressure  135/61 





(mmHg)   


 


O2 Sat by Pulse  92 





Oximetry   














  11/13/19 11/13/19 11/13/19





  01:39 02:01 03:09


 


Temperature   98.4 F


 


Pulse Rate   71


 


Respiratory 18 18 17





Rate   


 


Blood Pressure   115/56





(mmHg)   


 


O2 Sat by Pulse   95





Oximetry   














  11/13/19





  03:28


 


Temperature 


 


Pulse Rate 


 


Respiratory 18





Rate 


 


Blood Pressure 





(mmHg) 


 


O2 Sat by Pulse 





Oximetry 











Oxygen Devices in Use Now: None


Exam: 





Patient is lying on a bed in supine position and is not in acute dstress.


HEENT: nOrmocephalic and atraumatic. Sclera anicteric. EOMI. PERRLA.


Neck: No lymphadenopathy and enlarged thyroid. NO JVD elevation.


Lungs: Clear with no added sound.


Heart: S1/S2 heard with no murmur.


Abdomen: Soft, nondistended and nontender. Normal BS heard.


Extremities; NO evidence of bleeding or bruising. Active ROM decreased by pain 

on right hip.


Neuro: Alert, oriented and coperative. CN intact. Motor nomral and sensation 

intact.


Result Diagrams: 


 11/12/19 08:16





 11/12/19 08:16





Assess/Plan/Problems-Billing


Assessment: 





75 y/o F with h/o Right hip replacement and provoked PE presented with right 

hip pain after mechanical fall. She is found to have displaced fracture of 

right superior pubic ramus. Pain controlled on Ibuprofen and percocet.








- Patient Problems


(1) Pubic ramus fracture


Current Visit: Yes   Status: Acute   Code(s): S32.599A - OTH FRACTURE OF UNSP 

PUBIS, INIT ENCNTR FOR CLOSED FRACTURE   SNOMED Code(s): 91229668


   Comment: 


-new superior ramus # on right after low trauma fall


-old inferior ramus #- during hip replacement surgery


-Ambulated with PT


-on naproxen and percocet


repeat xray shows displaced # same as previous one


-dc today


   





(2) Osteopenia


Current Visit: Yes   Status: Acute   Code(s): M85.80 - OTH DISRD OF BONE 

DENSITY AND STRUCTURE, UNSPECIFIED SITE   SNOMED Code(s): 848294657


   Comment: 


-On vitamin D and calcium


-DEXA on december   





(3) Depression


Current Visit: Yes   Status: Acute   Code(s): F32.9 - MAJOR DEPRESSIVE DISORDER

, SINGLE EPISODE, UNSPECIFIED   SNOMED Code(s): 23056001


   Comment: 


Continue fluoxetine


-no active issues   





(4) Hyponatremia


Current Visit: Yes   Status: Acute   Code(s): E87.1 - HYPO-OSMOLALITY AND 

HYPONATREMIA   SNOMED Code(s): 03894335


   Comment: 


-Long standing, PCP to follow    





(5) DVT prophylaxis


Current Visit: Yes   Status: Acute   Code(s): Z29.9 - ENCOUNTER FOR 

PROPHYLACTIC MEASURES, UNSPECIFIED   SNOMED Code(s): 990088212


   Comment: 


-on lovenox   





(6) Full code status


Current Visit: Yes   Status: Acute   Code(s): Z78.9 - OTHER SPECIFIED HEALTH 

STATUS   SNOMED Code(s): 313290681


   


Status and Disposition: 





Observation


dc today; seo out


Attending: Padmini Govea





Attestation


Documenting Resident: Marcos Poe


Supervising Physician: Padmini Govea


Attending/Supervising Physician Comment: 





Addendum: Agree with resident note, stable for d/c with pain control and ortho 

follow up


Attestation: 


This service has been performed in part by a resident under the direction of a 

teaching physician.I, Padmini Govea, performed the service, or was physically 

present during the critical, or key portions of the service, furnished by the 

resident. I participated in the management of the patient.

## 2019-11-13 NOTE — DS
CC:  Dr. Collin Carbajal; Dr. Giorgio Mane

 

DISCHARGE SUMMARY:

 

DATE OF ADMISSION:  11/12/19

 

DATE OF DISCHARGE:  11/13/19

 

PRIMARY CARE PROVIDER:  Dr. Collin Carbajal.

 

DISPOSITION AT THE TIME OF DISCHARGE:  Discharge to home with home PT.

 

PRIMARY DIAGNOSIS:  Right-sided superior rami, nondisplaced fracture.

 

SECONDARY DIAGNOSES:

1.  Status post total right hip replacement in 2016.

2.  Osteopenia, possibly osteoporosis.

3.  Depression.

4.  History of provoked pulmonary embolism completed a 6 month course of Xarelto.

 

MEDICATIONS AT TIME OF DISCHARGE:

1.  Cyclobenzaprine 10 mg p.o. b.i.d. p.r.n. for 30 tabs for muscle spasm.

2.  Lidocaine patch 1 patch transdermally to right hip p.r.n. for pain.

3.  Naproxen 500 mg p.o. b.i.d. for additional 7 days status post discharge.

4.  Ondansetron 4 mg p.o. q.8 hours p.r.n.

5.  Oxycodone acetaminophen 5/325 one tab p.o. q.6 hours p.r.n.

6.  Vitamin D 1000 units p.o. daily.

7.  Os-Pavel 1 tab p.o. daily.

8.  Vitamin C 500 mg p.o. daily.

9.  Fluoxetine 10 mg p.o. q.h.s.

10.  Ferrous gluconate 240 mg p.o. daily.

 

HISTORY OF PRESENT ILLNESS AND HOSPITAL COURSE:  A 74-year-old female with above past medical history
 who presented on 11/12/19 after she fell during exercise class and noted to have pain and was nonwei
ghtbearing.  In the emergency room, she was found to have an x-ray, which showed a superior right non
displaced comminuted fracture and a small inferior ramus fracture that was old and thought to be asso
ciated with her prior right total hip arthroplasty.  She was actually slated to be discharged to home
 from the emergency room, although when she went to stand she found she could not bear weight and had
 extreme pain and thus the hospitalist team was asked to admit the patient for pain control and obser
vation.  Her hospital course by problem as follows.

 

1.  Superior ramus fracture.  The patient was able to ambulate after adequate pain control with PT an
d did well, a repeat pelvic x-ray was done after the patient ambulated that did not show any further 
displacement, is confirmed by Orthopedics and she was discharged on pain control including nonsteroid
al anti-inflammatories, a short course of opiate pain medications and home PT to continue encouraging
 gentle movement.  She was able to bear weight and void freely.

2.  Status total right hip.  The patient has been at excellent baseline function, exercise and drivin
g, doing well since her replacement 3 years ago.  She did have a complication of a PE from the surger
y although this is not the same risk and does not require anticoagulation.

3.  Depression.  Home medications were continued.

4.  DVT prophylaxis.  The patient was placed on Lovenox in the hospital.  On day of discharge the pat
aba is ambulating, tolerating diet, working with PT, amenable to go home, has good access to primary
 care and her  feels comfortable to be able to discharge her to home.

 

LABS AND STUDIES DONE DURING THIS HOSPITALIZATION:  Labs done in emergency room showed mild hyponatre
mars, which seems consistent with prior unchanged from past examination.  Hemoglobin 11.9, but otherwi
se unremarkable.  Imaging included a pelvic x-ray done 11/11/19 showed superior ramus fracture and a 
repeat pelvic x-ray after ambulation on 11/13/19 that showed no change.

 

CONSULTS DURING THIS HOSPITALIZATION:  Included Physical Therapy and Orthopedic Surgery and the patie
nt should follow up with Orthopedic Surgery in 1 to 2 weeks.

 

ITEMS TO FOLLOW UP ON STATUS POST DISCHARGE:

1.  Superior ramus fracture.  The patient needs ortho followup.  She has home PT and pain medications
 prescribed for a short period of time.

2.  Hyponatremia.  The patient had mild hyponatremia during this hospitalization. This appears chroni
c.  Consider urine studies if she has some low-lying reset [place].

 

TIME SPENT:  Forty five minutes was spent on the planning of this discharge with over half of that sp
ent directly at the bedside of the patient.  Plan of care was discussed with the patient's family wit
h no for further questions.  This discharge summary represents a ______ of 2 days of hospitalization,
 if there are any questions please do not hesitate to reach out and contact me directly my cell phone
 is [phone].  A physical exam was done on the day of discharge.

 

 [id number] #: [id number]

## 2019-11-14 ENCOUNTER — HOSPITAL ENCOUNTER (EMERGENCY)
Dept: HOSPITAL 25 - ED | Age: 74
Discharge: HOME | End: 2019-11-14
Payer: COMMERCIAL

## 2019-11-14 VITALS — DIASTOLIC BLOOD PRESSURE: 69 MMHG | SYSTOLIC BLOOD PRESSURE: 114 MMHG
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
## 2019-11-14 DIAGNOSIS — Z96.641: ICD-10-CM

## 2019-11-14 DIAGNOSIS — S32.591D: ICD-10-CM

## 2019-11-14 DIAGNOSIS — Z88.2: ICD-10-CM

## 2019-11-14 DIAGNOSIS — M25.551: Primary | ICD-10-CM

## 2019-11-14 PROCEDURE — 99283 EMERGENCY DEPT VISIT LOW MDM: CPT

## 2019-11-14 PROCEDURE — 72170 X-RAY EXAM OF PELVIS: CPT

## 2019-11-14 NOTE — ED
Lower Extremity





- HPI Summary


HPI Summary: 


Patient is a 73 y/o F w/ Hx of right hip replacement due to arthritis in March 2016 and nondisplaced fracture of the right-sided superior rami that occurred 11 /11/19 who presents to [facility] via EMS with chief complaint of right hip pain. 

Patient reports that she sustained her right pelvic fracture after she had 

fallen onto her right side in exercise class. She was evaluated at [facility] and 

was admitted due to difficulty with weight-bearing and hip pain. Patient 

received physical therapy and reports that she was better able to tolerate 

weight bearing and ambulation afterwards. The patient was discharged to home. 

However, she states that after discharge she continued to have pain and 

difficulty with ambulation with an exacerbation today, 11/14/19. She notes that 

certain positions and movements aggravate her pain. She has been taking her 

prescribed Percocet and Naproxen but states that she has not had any relief in 

pain with these medications. Last dosage of these medications was 1700 11/14/ 19. On triage, pain is rated 2/10. Home medications and allergies are reviewed. 








- History of Current Complaint


Stated Complaint: RT HIP PAIN PER EMS


Time Seen by Provider: 11/14/19 20:09


Hx Obtained From: Patient


Mechanism Of Injury: Fall From A Standing Position


Onset of Pain: Days, Prior to Arrival


Onset/Duration: Worse Since


Severity Currently: Mild


Pain Scale Used: 0-10 Numeric


Timing: Lasting Days


Location: Is Discrete @ - right pelvis


Associated Signs And Symptoms: Positive: Negative


Aggravating Factor(s): Ambulation, Movement, Weight Bearing, Stairs, Other - 

certain positions





- Allergies/Home Medications


Allergies/Adverse Reactions: 


 Allergies











Allergy/AdvReac Type Severity Reaction Status Date / Time


 


Sulfa (Sulfonamide Allergy  Rash Verified 11/14/19 20:19





Antibiotics)     














PMH/Surg Hx/FS Hx/Imm Hx


Endocrine/Hematology History: Reports: Hx Thyroid Disease - hashimotos


   Denies: Hx Diabetes


Cardiovascular History: 


   Denies: Hx Hypertension, Hx Pacemaker/ICD, Other Cardiovascular Problems/

Disorders - DENIES


Respiratory History: 


   Denies: Hx Asthma, Hx Chronic Obstructive Pulmonary Disease (COPD), Other 

Respiratory Problems/Disorders - DENIES


GI History: 


   Denies: Hx Ulcer


 History: 


   Denies: Hx Renal Disease


Musculoskeletal History: 


   Denies: Hx Rheumatoid Arthritis, Hx Osteoporosis - OSTEOPENIA


Sensory History: Reports: Hx Contacts or Glasses


   Denies: Hx Hearing Aid


Opthamlomology History: Reports: Hx Contacts or Glasses


Neurological History: Reports: Hx Migraine


Psychiatric History: 


   Denies: Hx Panic Disorder





- Cancer History


Hx Chemotherapy: No


Hx Radiation Therapy: No





- Surgical History


Surgery Procedure, Year, and Place: partial thyroidectomy 1995.  bone cyst 

removed x3.  rt hip replacement 3/11/16.  C SECTION X2.  BREAST BIOPSY


Infectious Disease History: 


   Denies: Hx Hepatitis, Hx Human Immunodeficiency Virus (HIV), History Other 

Infectious Disease





- Family History


Known Family History: 


   Negative: Hypertension





- Social History


Alcohol Use: Daily


Alcohol Amount: 1 glass of wine - none since March r/t Xarelto


Hx Substance Use: No


Substance Use Type: Reports: None


Hx Tobacco Use: No


Smoking Status (MU): Never Smoked Tobacco





Review of Systems


Negative: Fever - on vitals, temp is 98.4 F 


Musculoskeletal: Other - positive - right pelvic pain 


All Other Systems Reviewed And Are Negative: Yes





Physical Exam





- Summary


Physical Exam Summary: 


Appearance: Well-appearing, Well-nourished, lying in bed comfortable


Skin: Warm, dry, no obvious rash


Eyes: sclera anicteric, no conjunctival pallor


ENT: mucous membranes moist


Neck: deferred


Respiratory: No signs of respiratory distress


Cardiovascular: Appears well perfused, pulses are nml


Abdomen: deferred


Musculoskeletal: Patient has no bruising over the right hip. She has pain with 

ROM of right hip and with pelvic rock.  


Neurological: Awake and alert, mentation is normal, speech is fluent and 

appropriate


Psychiatric: affect is normal, does not appear anxious or depressed








Triage Information Reviewed: Yes


Vital Signs On Initial Exam: 





 Initial Vitals











Temp Pulse Resp BP Pulse Ox


 


 98.4 F   80   16   128/67   95 


 


 11/14/19 20:05  11/14/19 20:05  11/14/19 20:05  11/14/19 20:05  11/14/19 20:05








Vital Signs Reviewed: Yes





Procedures





- Sedation


Patient Received Moderate/Deep Sedation with Procedure: No





Diagnostics





- Laboratory


Lab Statement: Any lab studies that have been ordered have been reviewed, and 

results considered in the medical decision making process.





- Radiology


  ** RIGHT PELVIC X-RAY


Radiology Interpretation Completed By: ED Physician


Summary of Radiographic Findings: Stable pelvic fracture, pending official 

report.





Re-Evaluation





- Re-Evaluation


  ** First Eval


Re-Evaluation Time: 22:32


Comment: Attempted to ambulate with a walker. Patient had difficulty with this. 

She still reports some pain.  is present in room. Pain management and 

home care were discussed.





Lower Extremity Course/Dx





- Course


Course Of Treatment: Patient is a 73 y/o F w/ Hx of right hip replacement due 

to arthritis in March 2016 and nondisplaced fracture of the right-sided 

superior rami that occurred 11/11/19 who presents to [facility] via EMS with chief 

complaint of right hip pain. Patient reports that she sustained her right 

pelvic fracture after she had fallen onto her right side in exercise class. She 

was evaluated at [facility] and was admitted due to difficulty with weight-bearing 

and hip pain. Patient received physical therapy and reports that she was better 

able to tolerate weight bearing and ambulation afterwards. The patient was 

discharged to home. However, she states that after discharge she continued to 

have pain and difficulty with ambulation with an exacerbation today, 11/14/19. 

On exam, patient has no bruising over the right hip. She has pain with ROM of 

right hip and with pelvic rock. During ED course, patient received Percocet, 2 

tabs. Attempted to ambulate with a walker. Patient had difficulty with this. 

She still reports pain. Pain management and home care options were discussed. 

Patient is discharged to home and will follow up with PCP.





- Diagnoses


Provider Diagnoses: 


 Pelvis fracture, right








Discharge ED





- Sign-Out/Discharge


Documenting (check all that apply): Patient Departure - discharge 





- Discharge Plan


Condition: Stable


Disposition: HOME


Patient Education Materials:  Pelvic Fracture (ED)


Referrals: 


Collin Carbajal MD [Primary Care Provider] - 


Additional Instructions: 


I think you definitely need to increase the dosing of the oxycodone, at least 

in the near term until the fracture starts to knit back together. We are not 

shooting for 0 pain, but you do need to be able to get up and move about 

somewhat to prevent the complications of immobility. I would recommend taking 1 

every 4 hrs, and you can increase that to 2 every 4 hours if needed, as limited 

by side effects. These tend to be excess sedation and constipation. Contact 

your doctor tomorrow as you will likely need a new prescription by the end of 

the weekend.





- Billing Disposition and Condition


Condition: STABLE


Disposition: Home





- Attestation Statements


Document Initiated by Joaquin: Yes


Documenting Scribe: KRISHAN MUIR


Provider For Whom Joaquin is Documenting (Include Credential): KANE NUNEZ MD


Scribe Attestation: 


I, KRISHAN MUIR, scribed for KANE NUNEZ MD on 11/15/19 at 1842. 


Scribe Documentation Reviewed: Yes


Provider Attestation: 


The documentation as recorded by the scribeKRISHAN accurately reflects 

the service I personally performed and the decisions made by me, KANE NUNEZ MD


Status of Scribe Document: Viewed

## 2019-11-14 NOTE — XMS REPORT
Patient Summary Document

 Created on:2019



Patient:RADHA WATERS

Sex:Female

:1945

External Reference #:[id number]





Demographics







 Address  [address] [place], [place] [address]

 

 Home Phone  [phone]

 

 Preferred Language  English

 

 Marital Status  Unknown

 

 Restoration Affiliation  Unknown

 

 Race  Unknown

 

 Ethnic Group  Unknown









Author







 Organization  [facility]









Support







 Name  Relationship  Address  Phone

 

 MIKE SAINI, Unknown Name  [place]  [address] [place] [address]  [phone]



     [place] [id number]  









Care Team Providers







 Name  Role  Phone

 

 Unavailable  Unavailable  Unavailable









Problems

This patient has no known problems.



Allergies, Adverse Reactions, Alerts







 Allergy  Allergy  Status  Severity  Reaction(s)  Onset  Inactive  Treating  
Comments



 Name  Type        Date  Date  Clinician  

 

 Sulfa  Unknown  Active  Unknown  Reaction  2019    Interface  



 (Sulfonami        Unknown  -13      



 de                



 Antibiotic                



 s)                







Medications







 Ordered  Filled  Start  Stop  Current  Ordering  Indication  Dosage  Frequency
  Signature  Comments  Components



 Medication  Medication  Date  Date  Medication?  Clinician        (SIG)    



 Name  Name                    

 

 ferrous  ferrous      No  Unknown    Unknown  Unknown      



 gluconate  gluconate  3-26                  



 270 mg (27  270 mg (27                    



 mg iron)  mg iron)                    



 tablet  tablet                    

 

 Fluoxetine  Fluoxetine      No  Unknown    Unknown  Unknown      



 Hcl  Hcl  3-                  

 

 ascorbic  ascorbic  2019    Yes  Unknown    Unknown  Unknown      



 acid  acid  0                  



 (vitamin C)  (vitamin C)                    



 500 mg  500 mg                    



 tablet  tablet                    

 

 Calcium  Calcium  2019    Yes  Unknown    Unknown  Unknown      



 Carbonate/V  Carbonate/V  0                  



 itamin D3  itamin D3                    

 

 cholecalcif  cholecalcif  2019    Yes  Unknown    Unknown  Unknown      



 maynor  maynor                    



 (vitamin  (vitamin                    



 D3) 1,000  D3) 1,000                    



 unit (25  unit (25                    



 mcg) tablet  mcg) tablet                    

 

 oxyCODONE-a  oxyCODONE-a  2019    Yes  Unknown    Unknown  Unknown      



 cetaminophe  cetaminophe                    



 n 5 mg-325  n 5 mg-325                    



 mg tablet  mg tablet                    

 

 Dextrometho  Dextrometho  2019    Yes  Unknown    Unknown  Unknown      



 rphan  rphan                    



 Polistirex  Polistirex                    

 

 Phytonadion  Phytonadion  2019    Yes  Unknown    Unknown  Unknown      



 e (Vit K1)  e (Vit K1)                    







Vital Signs







 Vital Name  Observation Time  Observation Value  Comments

 

 SYSTOLIC mm[Hg]  2019 18:08:50  134 mm[Hg] mm[Hg]  Method: Sit

 

 DIASTOLIC mm[Hg]  2019 18:08:50  81 mm[Hg] mm[Hg]  Method: Sit

 

 PULSE  2019 18:08:50  79 /min /min  

 

 RESP RATE  2019 18:08:50  20 /min /min  

 

 TEMP  2019 18:08:50  97.3 [degF]  







Procedures

This patient has no known procedures.



Results







 Test Description  Test Time  Test Comments  Text Results  Atomic Results  
Result Comments









 Serum or plasma alanine  2019 08:16:00  Identifier 1742-6 Result  Unknown



 aminotransferase measurement    Time 2019 08:16:00  



 (enzymatic activity/volume)      









   Test Item  Value  Reference Range  Comments









 Serum or plasma alanine aminotransferase measurement  17 U/L  Unknown  Unknown
  F



 (enzymatic activity/volume) (test code = 1742-6)      



Ordering Physician UnknownSerum or plasma albumin/globulin mass [id number] 
08:16:00Identifier 1759-0 Result Time 2019 08:16:00Unknown





 Test Item  Value  Reference Range  Comments

 

 Serum or plasma albumin/globulin mass ratio  1.6   Unknown  Unknown  F



 (test code = 1759-0)      



Ordering Physician UnknownSerum or plasma calcium measurement (mass/volume) 08:16:00Identifier 94043-0 Result Time 2019 08:16:00Unknown





 Test Item  Value  Reference Range  Comments

 

 Serum or plasma calcium measurement  9.0 mg/dL  Unknown  Unknown  F



 (mass/volume) (test code = 18490-6)      



Ordering Physician UnknownSerum or plasma aspartate aminotransferase 
measurement (enzymatic activity/volume)2019 08:16:00Identifier 0-8 
Result Time 2019 08:16:00Unknown





 Test Item  Value  Reference Range  Comments

 

 Serum or plasma aspartate aminotransferase  26 U/L  Unknown  Unknown  F



 measurement (enzymatic activity/volume)      



 (test code = 1920-8)      



Ordering Physician UnknownSerum or plasma total bilirubin measurement (mass/
volume)2019 08:16:00Identifier - Result Time 2019 08:16:
00Unknown





 Test Item  Value  Reference Range  Comments

 

 Serum or plasma total bilirubin  0.80 mg/dL  Unknown  Unknown  F



 measurement (mass/volume) (test code =      



 -)      



Ordering Physician UnknownSerum or plasma carbon dioxide, total measurement (
moles/volume)2019 08:16:00Identifier 2028 Result Time 2019 08:16:
00Unknown





 Test Item  Value  Reference Range  Comments

 

 Serum or plasma carbon dioxide, total  30 mmol/L  Unknown  Unknown  F



 measurement (moles/volume) (test code =      



 -)      



Ordering Physician UnknownSerum or plasma chloride measurement (moles/volume)
2019 08:16:00Identifier 2075-0 Result Time 2019 08:16:00Unknown





 Test Item  Value  Reference Range  Comments

 

 Serum or plasma chloride measurement  94 mmol/L  Unknown  Unknown  F



 (moles/volume) (test code = 2075-0)      



Ordering Physician UnknownSerum or plasma creatinine measurement (mass/volume)
2019 08:16:00Identifier 2160-0 Result Time 2019 08:16:00Unknown





 Test Item  Value  Reference Range  Comments

 

 Serum or plasma creatinine measurement  0.59 mg/dL  Unknown  Unknown  F



 (mass/volume) (test code = 2160-0)      



Ordering Physician UnknownSerum glucose measurement (mass/volume)2019 08:
16:00Identifier 2345-7 Result Time 2019 08:16:00Unknown





 Test Item  Value  Reference Range  Comments

 

 Serum glucose measurement (mass/volume)  100 mg/dL  Unknown  Unknown  F



 (test code = 2345-7)      



Ordering Physician UnknownSerum or plasma potassium measurement (moles/volume)
2019 08:16:00Identifier 2823-3 Result Time 2019 08:16:00Unknown





 Test Item  Value  Reference Range  Comments

 

 Serum or plasma potassium measurement  3.6 mmol/L  Unknown  Unknown  F



 (moles/volume) (test code = 2823-3)      



Ordering Physician UnknownSerum total protein measurement (mass/volume) 08:16:00Identifier 2885-2 Result Time 2019 08:16:00Unknown





 Test Item  Value  Reference Range  Comments

 

 Serum total protein measurement  6.4 g/dL  Unknown  Unknown  F



 (mass/volume) (test code = 2885-2)      



Ordering Physician UnknownSerum or plasma sodium measurement (moles/volume) 08:16:00Identifier 2951-2 Result Time 2019 08:16:00Unknown





 Test Item  Value  Reference Range  Comments

 

 Serum or plasma sodium measurement  128 mmol/L  Unknown  Unknown  F



 (moles/volume) (test code = 2951-2)      



Ordering Physician UnknownSerum or plasma urea nitrogen measurement (mass/volume
)2019 08:16:00Identifier 3094-0 Result Time 2019 08:16:00Unknown





 Test Item  Value  Reference Range  Comments

 

 Serum or plasma urea nitrogen measurement  12 mg/dL  Unknown  Unknown  F



 (mass/volume) (test code = 3094-0)      



Ordering Physician UnknownSerum or plasma urea nitrogen/creatinine olmsk0877-58-
12 08:16:00Identifier 3097-3 Result Time 2019 08:16:00Unknown





 Test Item  Value  Reference Range  Comments

 

 Serum or plasma urea nitrogen/creatinine  20.3   Unknown  Unknown  F



 ratio (test code = 3097-3)      



Ordering Physician UnknownAutomated blood platelet mean volume gpzkowuwtrx6877-
11-12 08:16:00Identifier 48246-9 Result Time 2019 08:16:00Unknown





 Test Item  Value  Reference Range  Comments

 

 Automated blood platelet mean volume  7.8 fL  Unknown  Unknown  F



 measurement (test code = 57610-1)      



Ordering Physician UnknownSerum or plasma anion eyb3150-99-64 08:16:
00Identifier 33037-3 Result Time 2019 08:16:00Unknown





 Test Item  Value  Reference Range  Comments

 

 Serum or plasma anion gap (test code =  4 mmol/L  Unknown  Unknown  F



 33037-3)      



Ordering Physician UnknownAutomated blood leukocytes count corrected for 
nucleated erythrocytes (number/volume)2019 08:16:00Identifier 19111-7 
Result Time 2019 08:16:00Unknown





 Test Item  Value  Reference Range  Comments

 

 Automated blood leukocytes count  5.8 10^3/uL  Unknown  Unknown  F



 corrected for nucleated erythrocytes      



 (number/volume) (test code = 42132-9)      



Ordering Physician UnknownAutomated blood nucleated erythrocytes oywqgekod5334-
11-12 08:16:00Identifier 88040-4 Result Time 2019 08:16:00Unknown





 Test Item  Value  Reference Range  Comments

 

 Automated blood nucleated erythrocytes  0.0   Unknown  Unknown  F



 detection (test code = 42293-0)      



Ordering Physician UnknownAutomated blood hematocrit (percentage)2019 08:
16:00Identifier 4544-3 Result Time 2019 08:16:00Unknown





 Test Item  Value  Reference Range  Comments

 

 Automated blood hematocrit (percentage) (test  35 %  Unknown  Unknown  F



 code = 4544-3)      



Ordering Physician UnknownEstimated glomerular filtration rate (GFR) non-
 Uqrjoypz3790-68-73 08:16:00Identifier 48642-3 Result Time 2019 08:
16:00Unknown





 Test Item  Value  Reference Range  Comments

 

 Estimated glomerular filtration rate (GFR)  100.2   Unknown  Unknown  F



 non- (test code = 48642-3)      



Ordering Physician UnknownAutomated blood monocytes/100 tezhwhkhnx0732-68-96 08:
16:00Identifier 5905-5 Result Time 2019 08:16:00Unknown





 Test Item  Value  Reference Range  Comments

 

 Automated blood monocytes/100 leukocytes  9.4 %  Unknown  Unknown  F



 (test code = 5905-5)      



Ordering Physician UnknownSerum or plasma albumin measurement by bromocresol 
green (BCG) dye binding method (kd1429-10-60 08:16:00Identifier 15320-7 Result 
Time 2019 08:16:00Unknown





 Test Item  Value  Reference Range  Comments

 

 Serum or plasma albumin measurement by  3.9 g/dL  Unknown  Unknown  F



 bromocresol green (BCG) dye binding method      



 (ma (test code = 18785-0)      



Ordering Physician UnknownSerum or plasma alkaline phosphatase measurement (
enzymatic activity/volume)2019 08:16:00Identifier 6768-6 Result Time  08:16:00Unknown





 Test Item  Value  Reference Range  Comments

 

 Serum or plasma alkaline phosphatase  49 U/L  Unknown  Unknown  F



 measurement (enzymatic activity/volume)      



 (test code = 6768-6)      



Ordering Physician UnknownAutomated blood basophil count (number/volume) 08:16:00Identifier 704-7 Result Time 2019 08:16:00Unknown





 Test Item  Value  Reference Range  Comments

 

 Automated blood basophil count  0.0 10^3/ul  Unknown  Unknown  F



 (number/volume) (test code = 704-7)      



Ordering Physician UnknownAutomated blood basophils/100 rrvzevletr2423-07-62 08:
16:00Identifier 706-2 Result Time 2019 08:16:00Unknown





 Test Item  Value  Reference Range  Comments

 

 Automated blood basophils/100 leukocytes  0.4 %  Unknown  Unknown  F



 (test code = 706-2)      



Ordering Physician UnknownAutomated blood eosinophil count (number/volume) 08:16:00Identifier 711-2 Result Time 2019 08:16:00Unknown





 Test Item  Value  Reference Range  Comments

 

 Automated blood eosinophil count  0.1 10^3/ul  Unknown  Unknown  F



 (number/volume) (test code = 711-2)      



Ordering Physician UnknownAutomated blood eosinophils/100 rvmbsvfvvu1935-05-84 
08:16:00Identifier 713-8 Result Time 2019 08:16:00Unknown





 Test Item  Value  Reference Range  Comments

 

 Automated blood eosinophils/100 leukocytes  1.3 %  Unknown  Unknown  F



 (test code = 713-8)      



Ordering Physician UnknownBlood hemoglobin measurement (mass/volume)2019 
08:16:00Identifier 718-7 Result Time 2019 08:16:00Unknown





 Test Item  Value  Reference Range  Comments

 

 Blood hemoglobin measurement  11.9 g/dL  Unknown  Unknown  F



 (mass/volume) (test code = 718-7)      



Ordering Physician UnknownBlood lymphocytes automated count (number/volume) 08:16:00Identifier 731-0 Result Time 2019 08:16:00Unknown





 Test Item  Value  Reference Range  Comments

 

 Blood lymphocytes automated count  0.8 10^3/ul  Unknown  Unknown  F



 (number/volume) (test code = 731-0)      



Ordering Physician UnknownAutomated blood lymphocytes/100 vflkzfdweq8931-41-74 
08:16:00Identifier 736-9 Result Time 2019 08:16:00Unknown





 Test Item  Value  Reference Range  Comments

 

 Automated blood lymphocytes/100 leukocytes  13.9 %  Unknown  Unknown  F



 (test code = 736-9)      



Ordering Physician UnknownBlood monocytes automated count (number/volume)2019 08:16:00Identifier 742-7 Result Time 2019 08:16:00Unknown





 Test Item  Value  Reference Range  Comments

 

 Blood monocytes automated count  0.5 10^3/ul  Unknown  Unknown  F



 (number/volume) (test code = 742-7)      



Ordering Physician UnknownAutomated blood neutrophils/100 flrsovnoth7543-78-31 
08:16:00Identifier 770-8 Result Time 2019 08:16:00Unknown





 Test Item  Value  Reference Range  Comments

 

 Automated blood neutrophils/100 leukocytes  75.0 %  Unknown  Unknown  F



 (test code = 770-8)      



Ordering Physician UnknownBlood nucleated erythrocytes automated count (number/
volume)2019 08:16:00Identifier 771-6 Result Time 2019 08:16:
00Unknown





 Test Item  Value  Reference Range  Comments

 

 Blood nucleated erythrocytes automated  0.0 10^3/ul  Unknown  Unknown  F



 count (number/volume) (test code =      



 771-6)      



Ordering Physician UnknownAutomated blood platelet count (number/volume) 08:16:00Identifier 777-3 Result Time 2019 08:16:00Unknown





 Test Item  Value  Reference Range  Comments

 

 Automated blood platelet count  208 10^3/uL  Unknown  Unknown  F



 (number/volume) (test code = 777-3)      



Ordering Physician UnknownAutomated erythrocyte mean corpuscular hemoglobin (
mass per erythrocyte)2019 08:16:00Identifier 785-6 Result Time 2019 
08:16:00Unknown





 Test Item  Value  Reference Range  Comments

 

 Automated erythrocyte mean corpuscular  33 pg  Unknown  Unknown  F



 hemoglobin (mass per erythrocyte) (test code      



 = 785-6)      



Ordering Physician UnknownAutomated erythrocyte mean corpuscular hemoglobin 
concentration measurement (mass/ntz0423-74-16 08:16:00Identifier 786-4 Result 
Time 2019 08:16:00Unknown





 Test Item  Value  Reference Range  Comments

 

 Automated erythrocyte mean corpuscular  34 g/dL  Unknown  Unknown  F



 hemoglobin concentration measurement      



 (mass/vol (test code = 786-4)      



Ordering Physician UnknownAutomated erythrocyte mean corpuscular cwobgb2488-67-
12 08:16:00Identifier 787-2 Result Time 2019 08:16:00Unknown





 Test Item  Value  Reference Range  Comments

 

 Automated erythrocyte mean corpuscular volume  97 fL  Unknown  Unknown  F



 (test code = 787-2)      



Ordering Physician UnknownAutomated erythrocyte distribution width bykuf6429-03-
12 08:16:00Identifier 788-0 Result Time 2019 08:16:00Unknown





 Test Item  Value  Reference Range  Comments

 

 Automated erythrocyte distribution width ratio  14 %  Unknown  Unknown  F



 (test code = 788-0)      



Ordering Physician UnknownAutomated blood erythrocyte count (number/volume) 08:16:00Identifier 789-8 Result Time 2019 08:16:00Unknown





 Test Item  Value  Reference Range  Comments

 

 Automated blood erythrocyte count  3.59 10^6 /uL  Unknown  Unknown  F



 (number/volume) (test code = 789-8)      



Ordering Physician UnknownLymphocyte proliferation sycj6284-17-17 08:16:
00Identifier [id number] Result Time 2019 08:16:00Unknown





 Test Item  Value  Reference Range  Comments

 

 Lymphocyte proliferation test (test  4.3 10^3/ul  Unknown  Unknown  F



 code = EUM8136)      



Ordering Physician Unknown

## 2019-11-15 NOTE — DS
DISCHARGE SUMMARY:

 

DATE OF ADMISSION:

 

DATE OF DISCHARGE:

 

ADDENDUM:  This is an addendum to the discharge summary that was already 
completed and signed on 11/13/19.

 

DISPOSITION AT TIME OF THE DISCHARGE:  Stable, to discharged to home.

 

 [id number]/CPS #: [id number]

ALEJANDRO

## 2020-02-26 ENCOUNTER — HOSPITAL ENCOUNTER (EMERGENCY)
Dept: HOSPITAL 25 - ED | Age: 75
Discharge: HOME | End: 2020-02-26
Payer: COMMERCIAL

## 2020-02-26 VITALS — DIASTOLIC BLOOD PRESSURE: 61 MMHG | SYSTOLIC BLOOD PRESSURE: 129 MMHG

## 2020-02-26 DIAGNOSIS — R53.1: ICD-10-CM

## 2020-02-26 DIAGNOSIS — Z88.2: ICD-10-CM

## 2020-02-26 DIAGNOSIS — R31.29: ICD-10-CM

## 2020-02-26 DIAGNOSIS — Z96.641: ICD-10-CM

## 2020-02-26 DIAGNOSIS — R10.12: Primary | ICD-10-CM

## 2020-02-26 LAB
ALBUMIN SERPL BCG-MCNC: 4.7 G/DL (ref 3.2–5.2)
ALBUMIN/GLOB SERPL: 1.7 {RATIO} (ref 1–3)
ALP SERPL-CCNC: 107 U/L (ref 34–104)
ALT SERPL W P-5'-P-CCNC: 15 U/L (ref 7–52)
ANION GAP SERPL CALC-SCNC: 6 MMOL/L (ref 2–11)
AST SERPL-CCNC: 25 U/L (ref 13–39)
BASOPHILS # BLD AUTO: 0 10^3/UL (ref 0–0.2)
BUN SERPL-MCNC: 11 MG/DL (ref 6–24)
BUN/CREAT SERPL: 19 (ref 8–20)
CALCIUM SERPL-MCNC: 9.9 MG/DL (ref 8.6–10.3)
CHLORIDE SERPL-SCNC: 96 MMOL/L (ref 101–111)
EOSINOPHIL # BLD AUTO: 0.1 10^3/UL (ref 0–0.6)
GLOBULIN SER CALC-MCNC: 2.8 G/DL (ref 2–4)
GLUCOSE SERPL-MCNC: 63 MG/DL (ref 70–100)
HCO3 SERPL-SCNC: 30 MMOL/L (ref 22–32)
HCT VFR BLD AUTO: 37 % (ref 35–47)
HGB BLD-MCNC: 13.2 G/DL (ref 12–16)
LYMPHOCYTES # BLD AUTO: 0.8 10^3/UL (ref 1–4.8)
MCH RBC QN AUTO: 34 PG (ref 27–31)
MCHC RBC AUTO-ENTMCNC: 35 G/DL (ref 31–36)
MCV RBC AUTO: 95 FL (ref 80–97)
MONOCYTES # BLD AUTO: 0.5 10^3/UL (ref 0–0.8)
NEUTROPHILS # BLD AUTO: 3.1 10^3/UL (ref 1.5–7.7)
NRBC # BLD AUTO: 0 10^3/UL
NRBC BLD QL AUTO: 0
PLATELET # BLD AUTO: 254 10^3/UL (ref 150–450)
POTASSIUM SERPL-SCNC: 4.1 MMOL/L (ref 3.5–5)
PROT SERPL-MCNC: 7.5 G/DL (ref 6.4–8.9)
RBC # BLD AUTO: 3.93 10^6 /UL (ref 3.7–4.87)
RBC UR QL AUTO: (no result)
SODIUM SERPL-SCNC: 132 MMOL/L (ref 135–145)
WBC # BLD AUTO: 4.5 10^3/UL (ref 3.5–10.8)
WBC UR QL AUTO: (no result)

## 2020-02-26 PROCEDURE — 84484 ASSAY OF TROPONIN QUANT: CPT

## 2020-02-26 PROCEDURE — 36415 COLL VENOUS BLD VENIPUNCTURE: CPT

## 2020-02-26 PROCEDURE — 81003 URINALYSIS AUTO W/O SCOPE: CPT

## 2020-02-26 PROCEDURE — 86140 C-REACTIVE PROTEIN: CPT

## 2020-02-26 PROCEDURE — 71046 X-RAY EXAM CHEST 2 VIEWS: CPT

## 2020-02-26 PROCEDURE — 99283 EMERGENCY DEPT VISIT LOW MDM: CPT

## 2020-02-26 PROCEDURE — 85025 COMPLETE CBC W/AUTO DIFF WBC: CPT

## 2020-02-26 PROCEDURE — 87086 URINE CULTURE/COLONY COUNT: CPT

## 2020-02-26 PROCEDURE — 80053 COMPREHEN METABOLIC PANEL: CPT

## 2020-02-26 PROCEDURE — 74176 CT ABD & PELVIS W/O CONTRAST: CPT

## 2020-02-26 PROCEDURE — 93005 ELECTROCARDIOGRAM TRACING: CPT

## 2020-02-26 PROCEDURE — 81015 MICROSCOPIC EXAM OF URINE: CPT

## 2020-02-26 PROCEDURE — 83690 ASSAY OF LIPASE: CPT

## 2020-02-26 NOTE — XMS REPORT
Patient Summary Document

 Created on:2019



Patient:RADHA WATERS

Sex:Female

:1945

External Reference #:[id number]





Demographics







 Address  [address] [place], [place] [address]

 

 Home Phone  [phone]

 

 Preferred Language  English

 

 Marital Status  Unknown

 

 Orthodox Affiliation  Unknown

 

 Race  Unknown

 

 Ethnic Group  Unknown









Author







 Organization  [facility]









Support







 Name  Relationship  Address  Phone

 

 MIKE SAINI, Unknown Name  NextOfKin  [address] [place] [address]  [phone]



     [place] [id number]  









Care Team Providers







 Name  Role  Phone

 

 Unavailable  Unavailable  Unavailable









Problems







 Condition  Condition  Condition  Status  Onset  Resolution  Last  Treating  
Comments



 Name  Details  Category    Date  Date  Treatment  Clinician  



             Date    

 

 Unspecified  Unspecified  Diagnosis  Active  2019      Roberta  



 fracture of  fracture of      1-15      Taveras  



 right  right              



 pubis,  pubis,              



 subsequent  subsequent              



 encounter  encounter              



 for  for              



 fracture  fracture              



 with  with              



 routine  routine              



 healing  healing              

 

 Other  Other  Diagnosis  Active  2019      Roberta  



 specified  specified      1-15      Taveras  



 disorders  disorders              



 of bone  of bone              



 density and  density and              



 structure,  structure,              



 unspecified  unspecified              



 site  site              

 

 Major  Major  Diagnosis  Active  2019      Roberta  



 depressive  depressive      1-15      Taveras  



 disorder,  disorder,              



 recurrent,  recurrent,              



 unspecified  unspecified              

 

 Presence of  Presence of  Diagnosis  Active  2019      Roberta  



 right  right      1-15      [place]as  



 artificial  artificial              



 hip joint  hip joint              

 

 Safety  can be left  Safety  Resolve  2019    Amy  



   alone for    d  1-15  09:35:00    (Sheri)  



   only short      08:45:      Enriquez  



   periods      00      [id number]  

 

 Activity  ADL  Activity  Resolve  2019    Cooper  



   assistance    d  1-15  09:35:00    Taylor  



   required      13:50:      [id number]  



         00        

 

 Safety  structural  Safety  Resolve  2019    Cooper  



   barriers    d  1-15  13:25:00    Taylor  



   present      13:50:      [id number]  



         00        

 

 Safety  fall risk  Safety  Resolve  2019    Cooper  



   factor    d  1-15  13:25:00    Taylor  



   present      13:50:      [id number]  



         00        

 

 Safety  knowledge/s  Safety  Resolve  2019    Cooper  



   kill    d  1-15  13:25:00    Taylor  



   deficit: pt      13:50:      [id number]  



         00        

 

 Safety  knowledge/s  Safety  Resolve  2019    Cooper  



   kill    d  1-15  13:25:00    Taylor  



   deficit: cg      13:50:      [id number]  



         00        

 

 Balance/End  balance/  PT/OT:  Resolve  2019    Cooper nessance  rdination  Balance/En  d  1-15  09:35:00    Taylor  



   deficit  durance    13:50:      [id number]  



         00        

 

 OT: Self  self-care  OT:  Resolve  2019    Cooper  



 Care  deficit  Self-Care  d  1-15  09:35:00    Taylor  



         13:50:      [id number]  



         00        

 

 Gait/Locomo  gait  PT/OT:  Resolve  2019    Cooper  



 tion  deficit  Gait/Locom  d  1-15  09:35:00    Kobziewicz  



 problems    otion    13:50:      [id number]  



         00        

 

 Neuro  anxiety  Neuro/Emot  Resolve  2019    Roberta  



   present  ion  d    09:35:00    Taveras  



         13:25:        



         00        

 

 Neuro  knowledge/s  Neuro/Emot  Resolve  2019    Roberta jeong  ion  d    09:35:00    [place]  



   deficit: pt      13:25:        



         00        

 

 Safety  risk for  Safety  Resolve  2019    Roberta  



   hospitaliza    d    13:25:00    [place]  



   tion      13:25:        



         00        

 

 Musculoskel  requires  Musculoske  Resolve  2019    Roberta  



 etal  human  letal  d    09:35:00    [place]  



   assist to      13:25:        



   leave home      00        

 

 Safety  risk for  Safety  Resolve  2019    Roberta  



   hospitaliza    d    09:35:00    [place]  



   tion      10:45:        



         00        

 

 Safety  fall risk  Safety  Resolve  2019    Roberta  



   factor    d    09:35:00    [place]  



   present      10:45:        



         00        







Allergies, Adverse Reactions, Alerts







 Allergy  Allergy  Status  Severity  Reaction(s)  Onset  Inactive  Treating  
Comments



 Name  Type        Date  Date  Clinician  

 

 Sulfa  Unknown  Active  Unknown  Reaction  2019    Interface  



 (Sulfonami        Unknown  -13      



 de                



 Antibiotic                



 s)                







Medications







 Ordered  Filled  Start  Stop  Current  Ordering  Indication  Dosage  Frequency
  Signature  Comments  Components



 Medication  Medication  Date  Date  Medication?  Clinician        (SIG)    



 Name  Name                    

 

 ferrous  ferrous  2019  2019-  Yes  Alhaji    Unknown  Unknown      



 gluconate  gluconate  1-15  11-26    MD,Giorgio A            



 270 mg (27  270 mg (27                    



 mg iron)  mg iron)                    



 tablet  tablet                    

 

 Fluoxetine  Fluoxetine  2019-  Yes  Alhaji    Unknown  Unknown      



 Hcl  Hcl  1-15  11-26    MD,Giorgio A            

 

 ascorbic  ascorbic  2019-  Yes  Alhaji    Unknown  Unknown      



 acid  acid  1-15  11-26    MD,Giorgio A            



 (vitamin C)  (vitamin C)                    



 500 mg  500 mg                    



 tablet  tablet                    

 

 Calcium  Calcium  2019-  Yes  Alhaji    Unknown  Unknown      



 Carbonate/V  Carbonate/V  1-15  11-18    MD,Giorgio A            



 itamin D3  itamin D3                    

 

 cholecalcif  cholecalcif  2019-  Yes  Alhaji    Unknown  Unknown    
  



 maynor  maynor  1-15  11-26    MD,Giorgio A            



 (vitamin  (vitamin                    



 D3) 25 mcg  D3) 25 mcg                    



 (1,000  (1,000                    



 unit)  unit)                    



 tablet  tablet                    

 

 oxyCODONE-a  oxyCODONE-a  2019-  Yes  Alhaji    Unknown  Unknown    
  



 cetaminophe  cetaminophe  1-15  11-26    MD,Giorgio A            



 n 5 mg-325  n 5 mg-325                    



 mg tablet  mg tablet                    

 

 Phytonadion  Phytonadion  2019-  Yes  Alhaji    Unknown  Unknown    
  



 e (Vit K1)  e (Vit K1)  1-15  11-26    MD,Giorgio A            

 

 cyclobenzap  cyclobenzap  2019-  Yes  Alhaji    Unknown  Unknown    
  



 rine 10 mg  rine 10 mg  1-15  11-26    MD,Giorgio A            



 tablet  tablet                    

 

 lidocaine 5  lidocaine 5  2019-  Yes  Alhaji    Unknown  Unknown    
  



 % topical  % topical  1-15  11-26    MD,Giorgio A            



 patch  patch                    

 

 naproxen  naproxen  2019-  Yes  Alhaji    Unknown  Unknown      



 500 mg  500 mg  1-15  11-26    MD,Giorgio A            



 tablet  tablet                    

 

 ondansetron  ondansetron  2019-  Yes  Alhaji    Unknown  Unknown    
  



 4 mg  4 mg  1-15  11-26    MD,Giorgio A            



 disintegrat  disintegrat                    



 ing tablet  ing tablet                    







Vital Signs







 Vital Name  Observation Time  Observation Value  Comments

 

 SYSTOLIC mm[Hg]  2019 18:09:12  114 mm[Hg] mm[Hg]  Method: Sit

 

 DIASTOLIC mm[Hg]  2019 18:09:12  70 mm[Hg] mm[Hg]  Method: Sit

 

 PULSE  2019 18:09:12  75 /min /min  

 

 RESP RATE  2019 18:09:12  18 /min /min  

 

 TEMP  2019 18:09:12  98.4 [degF]  







Procedures

This patient has no known procedures.



Results







 Test Description  Test Time  Test Comments  Text Results  Atomic Results  
Result Comments









 Serum or plasma alanine  2019 08:16:00  Identifier 1742-6 Result  Unknown



 aminotransferase measurement    Time 2019 08:16:00  



 (enzymatic activity/volume)      









   Test Item  Value  Reference Range  Comments









 Serum or plasma alanine aminotransferase measurement  17 U/L  Unknown  Unknown
  F



 (enzymatic activity/volume) (test code = 1742-6)      



Ordering Physician UnknownSerum or plasma albumin/globulin mass [id number] 
08:16:00Identifier 1759-0 Result Time 2019 08:16:00Unknown





 Test Item  Value  Reference Range  Comments

 

 Serum or plasma albumin/globulin mass ratio  1.6   Unknown  Unknown  F



 (test code = 1759-0)      



Ordering Physician UnknownSerum or plasma calcium measurement (mass/volume) 08:16:00Identifier 86498-2 Result Time 2019 08:16:00Unknown





 Test Item  Value  Reference Range  Comments

 

 Serum or plasma calcium measurement  9.0 mg/dL  Unknown  Unknown  F



 (mass/volume) (test code = 39634-7)      



Ordering Physician UnknownSerum or plasma aspartate aminotransferase 
measurement (enzymatic activity/volume)2019 08:16:00Identifier -8 
Result Time 2019 08:16:00Unknown





 Test Item  Value  Reference Range  Comments

 

 Serum or plasma aspartate aminotransferase  26 U/L  Unknown  Unknown  F



 measurement (enzymatic activity/volume)      



 (test code = 1920-8)      



Ordering Physician UnknownSerum or plasma total bilirubin measurement (mass/
volume)2019 08:16:00Identifier - Result Time 2019 08:16:
00Unknown





 Test Item  Value  Reference Range  Comments

 

 Serum or plasma total bilirubin  0.80 mg/dL  Unknown  Unknown  F



 measurement (mass/volume) (test code =      



 -)      



Ordering Physician UnknownSerum or plasma carbon dioxide, total measurement (
moles/volume)2019 08:16:00Identifier - Result Time 2019 08:16:
00Unknown





 Test Item  Value  Reference Range  Comments

 

 Serum or plasma carbon dioxide, total  30 mmol/L  Unknown  Unknown  F



 measurement (moles/volume) (test code =      



 2028-9)      



Ordering Physician UnknownSerum or plasma chloride measurement (moles/volume)
2019 08:16:00Identifier 5-0 Result Time 2019 08:16:00Unknown





 Test Item  Value  Reference Range  Comments

 

 Serum or plasma chloride measurement  94 mmol/L  Unknown  Unknown  F



 (moles/volume) (test code = 2075-0)      



Ordering Physician UnknownSerum or plasma creatinine measurement (mass/volume)
2019 08:16:00Identifier 2160-0 Result Time 2019 08:16:00Unknown





 Test Item  Value  Reference Range  Comments

 

 Serum or plasma creatinine measurement  0.59 mg/dL  Unknown  Unknown  F



 (mass/volume) (test code = 2160-0)      



Ordering Physician UnknownSerum glucose measurement (mass/volume)2019 08:
16:00Identifier 2345-7 Result Time 2019 08:16:00Unknown





 Test Item  Value  Reference Range  Comments

 

 Serum glucose measurement (mass/volume)  100 mg/dL  Unknown  Unknown  F



 (test code = 2345-7)      



Ordering Physician UnknownSerum or plasma potassium measurement (moles/volume)
2019 08:16:00Identifier 2823-3 Result Time 2019 08:16:00Unknown





 Test Item  Value  Reference Range  Comments

 

 Serum or plasma potassium measurement  3.6 mmol/L  Unknown  Unknown  F



 (moles/volume) (test code = 2823-3)      



Ordering Physician UnknownSerum total protein measurement (mass/volume) 08:16:00Identifier 2885-2 Result Time 2019 08:16:00Unknown





 Test Item  Value  Reference Range  Comments

 

 Serum total protein measurement  6.4 g/dL  Unknown  Unknown  F



 (mass/volume) (test code = 2885-2)      



Ordering Physician UnknownSerum or plasma sodium measurement (moles/volume) 08:16:00Identifier 2951-2 Result Time 2019 08:16:00Unknown





 Test Item  Value  Reference Range  Comments

 

 Serum or plasma sodium measurement  128 mmol/L  Unknown  Unknown  F



 (moles/volume) (test code = 2951-2)      



Ordering Physician UnknownSerum or plasma urea nitrogen measurement (mass/volume
)2019 08:16:00Identifier 3094-0 Result Time 2019 08:16:00Unknown





 Test Item  Value  Reference Range  Comments

 

 Serum or plasma urea nitrogen measurement  12 mg/dL  Unknown  Unknown  F



 (mass/volume) (test code = 3094-0)      



Ordering Physician UnknownSerum or plasma urea nitrogen/creatinine vjfuu7057-09-
12 08:16:00Identifier 3097-3 Result Time 2019 08:16:00Unknown





 Test Item  Value  Reference Range  Comments

 

 Serum or plasma urea nitrogen/creatinine  20.3   Unknown  Unknown  F



 ratio (test code = 3097-3)      



Ordering Physician UnknownAutomated blood platelet mean volume rgbhehzarhr5633-
11-12 08:16:00Identifier 07532-1 Result Time 2019 08:16:00Unknown





 Test Item  Value  Reference Range  Comments

 

 Automated blood platelet mean volume  7.8 fL  Unknown  Unknown  F



 measurement (test code = 64565-6)      



Ordering Physician UnknownSerum or plasma anion [id number] 08:16:
00Identifier 33037-3 Result Time 2019 08:16:00Unknown





 Test Item  Value  Reference Range  Comments

 

 Serum or plasma anion gap (test code =  4 mmol/L  Unknown  Unknown  F



 33037-3)      



Ordering Physician UnknownAutomated blood leukocytes count corrected for 
nucleated erythrocytes (number/volume)2019 08:16:00Identifier 37677-0 
Result Time 2019 08:16:00Unknown





 Test Item  Value  Reference Range  Comments

 

 Automated blood leukocytes count  5.8 10^3/uL  Unknown  Unknown  F



 corrected for nucleated erythrocytes      



 (number/volume) (test code = 87439-5)      



Ordering Physician UnknownAutomated blood nucleated erythrocytes jrxcumrzh3526-
11-12 08:16:00Identifier 93322-0 Result Time 2019 08:16:00Unknown





 Test Item  Value  Reference Range  Comments

 

 Automated blood nucleated erythrocytes  0.0   Unknown  Unknown  F



 detection (test code = 04130-5)      



Ordering Physician UnknownAutomated blood hematocrit (percentage)2019 08:
16:00Identifier 4544-3 Result Time 2019 08:16:00Unknown





 Test Item  Value  Reference Range  Comments

 

 Automated blood hematocrit (percentage) (test  35 %  Unknown  Unknown  F



 code = 4544-3)      



Ordering Physician UnknownEstimated glomerular filtration rate (GFR) non-
 Mrgjmhhj5835-20-89 08:16:00Identifier 48642-3 Result Time 2019 08:
16:00Unknown





 Test Item  Value  Reference Range  Comments

 

 Estimated glomerular filtration rate (GFR)  100.2   Unknown  Unknown  F



 non- (test code = 48642-3)      



Ordering Physician UnknownAutomated blood monocytes/100 eckmjdoyix4093-65-29 08:
16:00Identifier 5905-5 Result Time 2019 08:16:00Unknown





 Test Item  Value  Reference Range  Comments

 

 Automated blood monocytes/100 leukocytes  9.4 %  Unknown  Unknown  F



 (test code = 5905-5)      



Ordering Physician UnknownSerum or plasma albumin measurement by bromocresol 
green (BCG) dye binding method (of9001-12-89 08:16:00Identifier 45032-6 Result 
Time 2019 08:16:00Unknown





 Test Item  Value  Reference Range  Comments

 

 Serum or plasma albumin measurement by  3.9 g/dL  Unknown  Unknown  F



 bromocresol green (BCG) dye binding method      



 (ma (test code = 57372-2)      



Ordering Physician UnknownSerum or plasma alkaline phosphatase measurement (
enzymatic activity/volume)2019 08:16:00Identifier 6768-6 Result Time  08:16:00Unknown





 Test Item  Value  Reference Range  Comments

 

 Serum or plasma alkaline phosphatase  49 U/L  Unknown  Unknown  F



 measurement (enzymatic activity/volume)      



 (test code = 6768-6)      



Ordering Physician UnknownAutomated blood basophil count (number/volume) 08:16:00Identifier 704-7 Result Time 2019 08:16:00Unknown





 Test Item  Value  Reference Range  Comments

 

 Automated blood basophil count  0.0 10^3/ul  Unknown  Unknown  F



 (number/volume) (test code = 704-7)      



Ordering Physician UnknownAutomated blood basophils/100 [id number] 08:
16:00Identifier 706-2 Result Time 2019 08:16:00Unknown





 Test Item  Value  Reference Range  Comments

 

 Automated blood basophils/100 leukocytes  0.4 %  Unknown  Unknown  F



 (test code = 706-2)      



Ordering Physician UnknownAutomated blood eosinophil count (number/volume) 08:16:00Identifier 711-2 Result Time 2019 08:16:00Unknown





 Test Item  Value  Reference Range  Comments

 

 Automated blood eosinophil count  0.1 10^3/ul  Unknown  Unknown  F



 (number/volume) (test code = 711-2)      



Ordering Physician UnknownAutomated blood eosinophils/100 hufnsrirqf6761-89-02 
08:16:00Identifier 713-8 Result Time 2019 08:16:00Unknown





 Test Item  Value  Reference Range  Comments

 

 Automated blood eosinophils/100 leukocytes  1.3 %  Unknown  Unknown  F



 (test code = 713-8)      



Ordering Physician UnknownBlood hemoglobin measurement (mass/volume)2019 
08:16:00Identifier 718-7 Result Time 2019 08:16:00Unknown





 Test Item  Value  Reference Range  Comments

 

 Blood hemoglobin measurement  11.9 g/dL  Unknown  Unknown  F



 (mass/volume) (test code = 718-7)      



Ordering Physician UnknownBlood lymphocytes automated count (number/volume) 08:16:00Identifier 731-0 Result Time 2019 08:16:00Unknown





 Test Item  Value  Reference Range  Comments

 

 Blood lymphocytes automated count  0.8 10^3/ul  Unknown  Unknown  F



 (number/volume) (test code = 731-0)      



Ordering Physician UnknownAutomated blood lymphocytes/100 [id number] 
08:16:00Identifier 736-9 Result Time 2019 08:16:00Unknown





 Test Item  Value  Reference Range  Comments

 

 Automated blood lymphocytes/100 leukocytes  13.9 %  Unknown  Unknown  F



 (test code = 736-9)      



Ordering Physician UnknownBlood monocytes automated count (number/volume)2019 08:16:00Identifier 742-7 Result Time 2019 08:16:00Unknown





 Test Item  Value  Reference Range  Comments

 

 Blood monocytes automated count  0.5 10^3/ul  Unknown  Unknown  F



 (number/volume) (test code = 742-7)      



Ordering Physician UnknownAutomated blood neutrophils/100 [id number] 
08:16:00Identifier 770-8 Result Time 2019 08:16:00Unknown





 Test Item  Value  Reference Range  Comments

 

 Automated blood neutrophils/100 leukocytes  75.0 %  Unknown  Unknown  F



 (test code = 770-8)      



Ordering Physician UnknownBlood nucleated erythrocytes automated count (number/
volume)2019 08:16:00Identifier 771-6 Result Time 2019 08:16:
00Unknown





 Test Item  Value  Reference Range  Comments

 

 Blood nucleated erythrocytes automated  0.0 10^3/ul  Unknown  Unknown  F



 count (number/volume) (test code =      



 771-6)      



Ordering Physician UnknownAutomated blood platelet count (number/volume) 08:16:00Identifier 777-3 Result Time 2019 08:16:00Unknown





 Test Item  Value  Reference Range  Comments

 

 Automated blood platelet count  208 10^3/uL  Unknown  Unknown  F



 (number/volume) (test code = 777-3)      



Ordering Physician UnknownAutomated erythrocyte mean corpuscular hemoglobin (
mass per erythrocyte)2019 08:16:00Identifier 785-6 Result Time 2019 
08:16:00Unknown





 Test Item  Value  Reference Range  Comments

 

 Automated erythrocyte mean corpuscular  33 pg  Unknown  Unknown  F



 hemoglobin (mass per erythrocyte) (test code      



 = 785-6)      



Ordering Physician UnknownAutomated erythrocyte mean corpuscular hemoglobin 
concentration measurement (mass/ahz3921-10-99 08:16:00Identifier 786-4 Result 
Time 2019 08:16:00Unknown





 Test Item  Value  Reference Range  Comments

 

 Automated erythrocyte mean corpuscular  34 g/dL  Unknown  Unknown  F



 hemoglobin concentration measurement      



 (mass/vol (test code = 786-4)      



Ordering Physician UnknownAutomated erythrocyte mean corpuscular unrxzt5711-39-
12 08:16:00Identifier 787-2 Result Time 2019 08:16:00Unknown





 Test Item  Value  Reference Range  Comments

 

 Automated erythrocyte mean corpuscular volume  97 fL  Unknown  Unknown  F



 (test code = 787-2)      



Ordering Physician UnknownAutomated erythrocyte distribution width rwvoh1982-22-
12 08:16:00Identifier 788-0 Result Time 2019 08:16:00Unknown





 Test Item  Value  Reference Range  Comments

 

 Automated erythrocyte distribution width ratio  14 %  Unknown  Unknown  F



 (test code = 788-0)      



Ordering Physician UnknownAutomated blood erythrocyte count (number/volume) 08:16:00Identifier 789-8 Result Time 2019 08:16:00Unknown





 Test Item  Value  Reference Range  Comments

 

 Automated blood erythrocyte count  3.59 10^6 /uL  Unknown  Unknown  F



 (number/volume) (test code = 789-8)      



Ordering Physician UnknownLymphocyte proliferation [id number] 08:16:
00Identifier AIE3365 Result Time 2019 08:16:00Unknown





 Test Item  Value  Reference Range  Comments

 

 Lymphocyte proliferation test (test  4.3 10^3/ul  Unknown  Unknown  F



 code = SJX1190)      



Ordering Physician Unknown

## 2020-02-26 NOTE — ED
Abdominal Pain/Female





- HPI Summary


HPI Summary: 


75 year old F presenting to [facility] accompanied by male  complains of 

intermittent pain on her left abd under the ribs that radiates toward the back 

since yesterday 02/25/2020. Patient reports weakness. Patient denies n/v/d, 

trouble breathing, fever, cough, dysuria, and hematuria. PMHx of two broken 

pubic bones from a fall last year and a right hip fracture. No SocHx of alcohol

, drugs, or smoking. She is currently on six Advil a day and Gabapentin. SHx of 

right hip replacement. Allergy to sulfa drugs noted.The patient rates the pain 3

/10 in severity. Symptoms aggravated by nothing. Symptoms alleviated by 

nothing. Medications reviewed. Allergies noted. 


 Home Medications











 Medication  Instructions  Recorded  Confirmed  Type


 


Ferrous Gluconate 240 mg PO DAILY 03/26/16 11/14/19 History


 


Fluoxetine HCl [Prozac] 10 mg PO BEDTIME 03/26/16 11/14/19 History


 


Ascorbic Acid TAB* [Vitamin C 500 mg PO DAILY 10/11/19 11/14/19 History





TAB*]    


 


Cholecalciferol TAB* [Vitamin D 1,000 unit PO DAILY 11/11/19 11/14/19 History





TAB*]    


 


Phytonadione (Vit K1) [Vitamin K-1] 500 mcg PO DAILY 11/11/19 11/14/19 History


 


Lidocaine PATCH 5%* [Lidoderm 5% 1 patch TRANSDERM DAILY #10 patch 11/13/19 11/ 14/19 Rx





Patch*]    


 


Calcium Carbonate [Calcium] 600 mg PO DAILY 02/26/20 02/26/20 History


 


Denosumab [Prolia] 60 mg SUBCUT .Q6MONTHS 02/26/20 02/26/20 History


 


Gabapentin CAP(*) [Neurontin 100 100 mg PO TID 02/26/20 02/26/20 History





mg CAP(*)]    


 


Metaxalone TAB* [Skelaxin TAB*] 800 mg PO TID PRN 02/26/20 02/26/20 History


 


Triamcinolone 0.1% CREAM (NF) 1 applic TOPICAL DAILY 02/26/20 02/26/20 History





[Kenalog 0.1% Cream (NF)]    


 


Varicella-Zoster Ge/As01b/Pf 50 mcg IM ONCE 02/26/20 02/26/20 History





[Shingrix]    


 


Vitamin B Complex TAB* [B 1 tab PO DAILY 02/26/20 02/26/20 History





Complex-50*]    


 


oxyCODONE/Acetamin 5/325 MG* 1 - 2 tab PO Q4H PRN 02/26/20 02/26/20 History





[Percocet 5/325 TAB*]    

















- History of Current Complaint


Chief Complaint: EDAbdPain


Stated Complaint: LEFT SIDED ABD PAIN PER PT


Time Seen by Provider: 02/26/20 10:56


Hx Obtained From: Patient


Onset/Duration: Lasting Days, Still Present


Timing: Intermittent Episode Lasting


Severity Currently: Mild


Pain Intensity: 3


Pain Scale Used: 0-10 Numeric


Location: Other - Left side under ribs


Radiates to: Back


Aggravating Factor(s): Nothing


Alleviating Factor(s): Nothing


Allergies/Adverse Reactions: 


 Allergies











Allergy/AdvReac Type Severity Reaction Status Date / Time


 


Sulfa (Sulfonamide Allergy  Rash Verified 02/26/20 10:04





Antibiotics)     











Home Medications: 


 Home Medications





Ferrous Gluconate 240 mg PO DAILY 03/26/16 [History Confirmed 02/26/20]


Fluoxetine HCl [Prozac] 10 mg PO DAILY 03/26/16 [History Confirmed 02/26/20]


Ascorbic Acid TAB* [Vitamin C  TAB*] 500 mg PO DAILY 10/11/19 [History 

Confirmed 02/26/20]


Cholecalciferol TAB* [Vitamin D TAB*] 2,000 unit PO DAILY 11/11/19 [History 

Confirmed 02/26/20]


Phytonadione (Vit K1) [Vitamin K-1] 100 mcg PO DAILY 11/11/19 [History 

Confirmed 02/26/20]


Lidocaine PATCH 5%* [Lidoderm 5% Patch*] 1 patch TRANSDERM DAILY #10 patch 11/13 /19 [Rx Confirmed 02/26/20]


Calcium Carbonate [Calcium] 600 mg PO DAILY 02/26/20 [History Confirmed 02/26/20

]


Denosumab [Prolia] 60 mg SUBCUT .Q6MONTHS 02/26/20 [History Confirmed 02/26/20]


Gabapentin CAP(*) [Neurontin 100 mg CAP(*)] 100 mg PO TID 02/26/20 [History 

Confirmed 02/26/20]


Metaxalone TAB* [Skelaxin TAB*] 800 mg PO TID PRN 02/26/20 [History Confirmed 02 /26/20]


Omeprazole 20 mg PO QAM 14 Days #14 capsule. 02/26/20 [Rx]


Triamcinolone 0.1% CREAM (NF) [Kenalog 0.1% Cream (NF)] 1 applic TOPICAL DAILY 

02/26/20 [History Confirmed 02/26/20]


Varicella-Zoster Ge/As01b/Pf [Shingrix] 50 mcg IM ONCE 02/26/20 [History 

Confirmed 02/26/20]


Vitamin B Complex TAB* [B Complex-50*] 1 tab PO DAILY 02/26/20 [History 

Confirmed 02/26/20]


oxyCODONE/Acetamin 5/325 MG* [Percocet 5/325 TAB*] 1 - 2 tab PO Q4H PRN 02/26/ 20 [History Confirmed 02/26/20]











PMH/Surg Hx/FS Hx/Imm Hx


Endocrine/Hematology History: Reports: Hx Thyroid Disease - hashimotos


   Denies: Hx Diabetes


Cardiovascular History: 


   Denies: Hx Hypertension, Hx Pacemaker/ICD, Other Cardiovascular Problems/

Disorders - DENIES


Respiratory History: 


   Denies: Hx Asthma, Hx Chronic Obstructive Pulmonary Disease (COPD), Other 

Respiratory Problems/Disorders - DENIES


GI History: 


   Denies: Hx Ulcer


 History: 


   Denies: Hx Renal Disease


Musculoskeletal History: 


   Denies: Hx Rheumatoid Arthritis, Hx Osteoporosis - OSTEOPENIA


Sensory History: Reports: Hx Contacts or Glasses


   Denies: Hx Hearing Aid


Opthamlomology History: Reports: Hx Contacts or Glasses


Neurological History: Reports: Hx Migraine


Psychiatric History: 


   Denies: Hx Panic Disorder





- Cancer History


Hx Chemotherapy: No


Hx Radiation Therapy: No





- Surgical History


Surgery Procedure, Year, and Place: partial thyroidectomy 1995.  bone cyst 

removed x3.  rt hip replacement 3/11/16.  C SECTION X2.  BREAST BIOPSY.  

LAPAROSCOPY 1990'S


Infectious Disease History: No


Infectious Disease History: 


   Denies: Hx Hepatitis, Hx Human Immunodeficiency Virus (HIV), History Other 

Infectious Disease, Traveled Outside the [place] in Last 30 Days





- Family History


Known Family History: 


   Negative: Hypertension





- Social History


Alcohol Use: Daily


Alcohol Amount: 1 glass of wine - none since March r/t Xarelto


Hx Substance Use: No


Substance Use Type: Reports: None


Hx Tobacco Use: No


Smoking Status (MU): Never Smoked Tobacco





Review of Systems


Negative: Fever


Negative: Shortness Of Breath, Cough


Positive: Abdominal Pain - left side under ribs that radiates toward back .  

Negative: Vomiting, Diarrhea, Nausea


Negative: dysuria, hematuria


Positive: Weakness


All Other Systems Reviewed And Are Negative: Yes





Physical Exam





- Summary


Physical Exam Summary: 


Constitutional: Well-developed, Well-nourished, Alert. (-) Distressed


Skin: Warm, Dry, no overlying rash on LUQ


HENT: Normocephalic; Atraumatic


Eyes: Conjunctiva normal


Neck: Musculoskeletal ROM normal neck. (-) JVD, (-) Stridor, (-) Tracheal 

deviation


Cardio: Rhythm regular, rate normal, Heart sounds normal; Intact distal pulses; 

Radial pulses are 2+ and symmetric. (-) Murmur


Pulmonary/Chest wall: Effort normal. (-) Respiratory distress, (-) Wheezes, (-) 

Rales


Abd: Soft, (-) tenderness, (-) Distension, (-) Guarding, (-) Rebound


Musculoskeletal: Left lateral lower rib tenderness, LUQ tenderness with no 

rebound or guarding 


Lymph: (-) Cervical adenopathy


Neuro: Alert, Oriented x3


Psych: Flat affect








Triage Information Reviewed: Yes


Vital Signs On Initial Exam: 


 Initial Vitals











Temp Pulse Resp BP Pulse Ox


 


 98.4 F   73   16   138/71   96 


 


 02/26/20 10:02  02/26/20 10:02  02/26/20 10:02  02/26/20 10:02  02/26/20 10:02











Vital Signs Reviewed: Yes





Procedures





- Sedation


Patient Received Moderate/Deep Sedation with Procedure: No





Diagnostics





- Vital Signs


 Vital Signs











  Temp Pulse Resp BP Pulse Ox


 


 02/26/20 10:02  98.4 F  73  16  138/71  96














- Laboratory


Lab Results: 


 Lab Results











  02/26/20 Range/Units





  10:35 


 


WBC  4.5  (3.5-10.8)  10^3/uL


 


RBC  3.93  (3.70-4.87)  10^6 /uL


 


Hgb  13.2  (12.0-16.0)  g/dL


 


Hct  37  (35-47)  %


 


MCV  95  (80-97)  fL


 


MCH  34 H  (27-31)  pg


 


MCHC  35  (31-36)  g/dL


 


RDW  14  (10-15)  %


 


Plt Count  254  (150-450)  10^3/uL


 


MPV  7.5  (7.4-10.4)  fL


 


Neut % (Auto)  68.2  %


 


Lymph % (Auto)  17.6  %


 


Mono % (Auto)  11.8  %


 


Eos % (Auto)  1.5  %


 


Baso % (Auto)  0.9  %


 


Absolute Neuts (auto)  3.1  (1.5-7.7)  10^3/ul


 


Absolute Lymphs (auto)  0.8 L  (1.0-4.8)  10^3/ul


 


Absolute Monos (auto)  0.5  (0-0.8)  10^3/ul


 


Absolute Eos (auto)  0.1  (0-0.6)  10^3/ul


 


Absolute Basos (auto)  0.0  (0-0.2)  10^3/ul


 


Absolute Nucleated RBC  0.0  10^3/ul


 


Nucleated RBC %  0.0  











Result Diagrams: 


 02/26/20 10:35





 02/26/20 10:35


Lab Statement: Any lab studies that have been ordered have been reviewed, and 

results considered in the medical decision making process.





- Radiology


  ** CXR


Radiology Interpretation Completed By: Radiologist


Summary of Radiographic Findings: IMPRESSION:  POSSIBLE TRACE LEFT PLEURAL 

EFFUSION, OTHERWISE UNREMARKABLE.   has reviewed this report.





- CT


  ** CT Abd


CT Interpretation Completed By: Radiologist


Summary of CT Findings: IMPRESSION: No definite obstructive uropathy is noted. 

No other masses or fluid.  collections are noted. Likely old fracture of the 

right inferior pubic ramus and pubic.  symphysis.   has reviewed this 

report.





- EKG


  ** 1107


Cardiac Rate: NL


EKG Rhythm: Sinus Rhythm


Summary of EKG Findings: An EKG at 1107 reveals SR at a rate of 64 bpm and 

benign early repolarization.





Abdominal Pain Fem Course/Dx





- Course


Course Of Treatment: Patient is here with left flank and left upper quadrant 

pain.  Patient had tenderness in her left lateral rib cage and her left upper 

quadrant.  Patient had no tachycardia/hypoxia and has no risk factors for PE 

with a well score of 0.  Patient a chest x-ray showed no abnormality.  Patient 

had blood or performed which is grossly unremarkable.  Patient had a UA which 

showed marked scopic hematuria which she's had for years.  But due to her pain 

location, CT scan was ordered which was negative for kidney stone.  Patient 

does take copious amounts of Advil for her chronic pain and could likely have 

an underlying peptic ulcer.  Patient was started on omeprazole as a trial.





- Diagnoses


Provider Diagnoses: 


 Left upper quadrant pain, Left flank pain








Discharge ED





- Sign-Out/Discharge


Documenting (check all that apply): Patient Departure - discharge 





- Discharge Plan


Condition: Stable


Disposition: HOME


Prescriptions: 


Omeprazole 20 mg PO QAM 14 Days #14 capsule.


Patient Education Materials:  Flank Pain (ED)


Referrals: 


Collin Carbajal MD [Primary Care Provider] - 


Additional Instructions: 


Please avoid taking Advil, eating spicy foods, eating acidic foods


Please start your acid medicine


Please return if you have severe abdominal pain, blood in your stool, blood in 

your vomit, trouble breathing, any other concerning symptoms





- Billing Disposition and Condition


Condition: STABLE


Disposition: Home





- Attestation Statements


Document Initiated by Josselineibe: Yes


Documenting Scribe: Eleazar Tirado


Provider For Whom Joaquin is Documenting (Include Credential): Kulwinder Hall MD 


Scribe Attestation: 


Eleazar MCCOLLUM, scribed for Kulwinder Hall MD  on 02/26/20 at 1858. 


Scribe Documentation Reviewed: Yes


Provider Attestation: 


The documentation as recorded by the Eleazar sepulveda accurately 

reflects the service I personally performed and the decisions made by me, Kulwinder Hall MD 


Status of Scribe Document: Viewed

## 2020-02-26 NOTE — XMS REPORT
Continuity of Care Document (CCD)

 Created on:2020



Patient:Melinda Geller

Sex:Female

:1945

External Reference #:[id number]





Demographics







 Address  [address] [place] [address]

 

 Home Phone  [phone]

 

 Email Address  [email or web address]

 

 Preferred Language  en

 

 Marital Status  Not  or 

 

 Druze Affiliation  Unknown

 

 Race  White

 

 Ethnic Group  Not  or 









Author







 Name  Abhishek Neal NP (transmitted by agent of provider Susi Salmeron)

 

 Address  [address] [place] [address], [address]



   [place] [id number]









Care Team Providers







 Name  Role  Phone

 

 Collin Carbajal MD - Family Medicine  Care Team Information   [phone]
[phone]









Problems







 Active Problems  Provider  Date

 

 Carpal tunnel syndrome  Yaquelin Nielson M.D.  Onset: 2015

 

 Degenerative cervical spinal stenosis  Yaquelin Nielson M.D.  Onset: 2015









 Note: with degenerative disc disease









 Restless legs  Yaquelin Nielson M.D.  Onset: 2015

 

 Tension-type headache  Yaquelin Nielson M.D.  Onset: 2015

 

 Pulmonary embolism  Marika Wilder MD  Onset: 2016







Social History







 Type  Date  Description  Comments

 

 Birth Sex    Unknown  

 

 ETOH Use    1 drink a day (wine or  



     beer)  

 

 Tobacco Use  Start: Unknown  Patient has never  



     smoked  

 

 Recreational Drug Use    Denies Drug Use  

 

 Smoking Status  Reviewed: 20  Patient has never  



     smoked  

 

 Exercise Type/Frequency    Exercises regularly  Pool exercises



       3days/week







Allergies, Adverse Reactions, Alerts







 Active Allergies  Reaction  Severity  Comments  Date

 

 Sulfa Antibiotics      Mouth inflammation  2016









 Inactive Allergies









 NKDA        2011







Medications







 Active Medications  SIG  Qnty  Indications  Ordering  Date



         Provider  

 

 Gabapentin  take two caps by  540caps  R29.6  Ismael GALO  2020



            100mg  mouth three times a      REJI Patel  



 Capsules  day.        



           

 

 Suprep Bowel Prep  take according to  354ml    Lamine PEREZ  2020



 Kit  the instructions you      MD Luis Felipe  



   received, the        



 17.5-3.13-1.6GM/177  afternoon before and        



 ML Solution  morning of your        



   procedure.        

 

 Fluoxetine HCL  1 po qd  30caps    Unknown  



                10mg          



 Capsules          



           

 

 Vitamin C  1 by mouth every day      Unknown  



           500mg          



 Chewtabs          



           

 

 Iron  1 by mouth every day      Unknown  



      240(27Fe) mg          



 Tablets          



           

 

 Calcium 500+D  1 by mouth every day      Unknown  



           



 500-200mg-Unit          



 Tablets          



           

 

 Vitamin D  1 by mouth every day      Unknown  



           2000Unit          



 Tablets          



           

 

 Vitamin K2  1 by mouth every day      Unknown  



            100mcg          



 Capsules          



           

 

 Vitamin B-Complex  1 by mouth every day      Unknown  



           



 Tablets          



           

 

 Gabapentin  1 capsule three      Unknown  



            100mg  times daily.        



 Capsules          



           

 

 Advil  3-4 pills daily as      Unknown  



       200mg Tablets  needed        



           









 History Medications









 Vitamin D  1 by mouth every  90tabs    Giorgio Mane,  2019 -



 (Cholecalciferol)  other day      MD  2020



              25mcg (1000          



 Ut) Tablets          



           

 

 Cyclobenzaprine HCL  take 1 tab by mouth  90tabs    Giorgio Mane,  2019 
-



                10mg  2 times a day as      MD  2020



 Tablets  needed        

 

 Suprep Bowel Prep Kit  take according to  354ml    Lamine PEREZ  10/11/2019 -



   the instructions      MD Luis Felipe  2019



 17.5-3.13-1.6GM/177ML  you received, the        



 Solution  afternoon before        



   and morning of your        



   procedure.        







Medications Administered in Office







 Medication  SIG  Qnty  Indications  Ordering Provider  Date

 

 Depomedrol 40MG        Dg Curran MD  2016



       Injection          



           

 

 Depomedrol 80MG        Red Zeng M.D.  2014



       Injection          



           

 

 Depomedrol 80MG        Red Zeng M.D.  2011



       Injection          



           







Immunizations







 CPT Code  Status  Date  Vaccine  Lot #

 

   Given  2016  Fluvirin Im 3Yrs And Older  







Vital Signs







 Date  Vital  Result  Comment

 

 2020  9:24am  Height  66 inches  5'6"









 Weight  146.00 lb  

 

 Heart Rate  72 /min  

 

 BP Systolic Sitting  110 mmHg  

 

 BP Diastolic Sitting  84 mmHg  

 

 BMI (Body Mass Index)  23.6 kg/m2  









 2020  1:55pm  Height  66 inches  5'6"









 Weight  142.00 lb  

 

 Heart Rate  72 /min  

 

 BP Systolic  128 mmHg  

 

 BP Diastolic  64 mmHg  

 

 BMI (Body Mass Index)  22.9 kg/m2  







Results







 Description

 

 No Information Available







Procedures







 Date  Code  Description  Status

 

 2009  559193273  Diabetic Retinal Eye Exam  Completed

 

 1994  726566033  Diabetic Retinal Eye Exam  Completed







Medical Devices







 Description

 

 No Information Available







Encounters







 Type  Date  Location  Provider  Dx  Diagnosis

 

 Office Visit  2020  [facility]  Abhishek Neal, NP  R29.6  Repeated 
falls



   2:00p  [facility]      









 G25.81  Restless legs syndrome

 

 M79.2  Neuralgia and neuritis, unspecified









 Office Visit  2020  1:00p  [facility]  S32.501A  Unsp 
fracture



     [facility]



           for [facility]

 

 Office Visit  2020  2:00p  [facility]  S32.501A  Unsp 
fracture



     [facility]

 

 Office Visit  2019  2:00p  [facility]  S32.501A  Unsp 
fracture



     Orthopedics at  [facility], init



           for clos fx

 

 Office Visit  2019 10:53a  [facility]  S32.501A  Unsp 
fracture



     Orthopedics at  [facility]      [facility], init



           for clos fx









 W19.xxxA  Unspecified fall, initial encounter







Assessments







 Date  Code  Description  Provider

 

 2020  R29.6  Repeated falls  Abhishek Neal, NP

 

 2020  G25.81  Restless legs syndrome  Abhishek Ángel, NP

 

 2020  M79.2  Neuralgia and neuritis, unspecified  Abhishek Neal, NP

 

 2020  R29.6  Repeated falls  Abhishek Ángel, NP

 

 2020  G25.81  Restless legs syndrome  Abhishek Ravinderke, NP

 

 2020  M79.2  Neuralgia and neuritis, unspecified  Abhishek Yanivaake, NP

 

 2020  S32.501A  Unspecified fracture of right pubis, initial  Giorgio Mane MD



     encounter for closed fracture  

 

 2020  S32.501A  Unspecified fracture of right pubis, initial  Giorgio Mane MD



     encounter for closed fracture  

 

 2019  S32.501A  Unspecified fracture of right pubis, initial  Giorgio Mane MD



     encounter for closed fracture  

 

 2019  S32.501A  Unspecified fracture of right pubis, initial  Giorgio Mane MD



     encounter for closed fracture  

 

 2019  W19.xxxA  Unspecified fall, initial encounter  Giorgio Mane MD







Plan of Treatment

Future Appointment(s):2020 10:00 am - Abhishek Neal NP at [facility] 
[id number] 10:30 am - Lamine Briscoe MD at [facility] [id number]/22/
2020 10:00 am - Guanakito Padilla MD at [facility] - Abhishek Neal, NPR29.6 Repeated fallsNew Medication:Gabapentin 100 mg - 
take two caps by mouth three times a day.New Therapy:Physical TherapyFollow up:1
-2 NWOPAXO44.81 Restless legs awjqcygiQ47.2 Neuralgia and neuritis, 
unspecifiedReferral:Fiona Tomlin MD, Surgery,Neurological



Functional Status







 Functional Condition  Comment  Date  Status

 

 Rolling walker is used to ambulate      Active







Mental Status







 Description

 

 No Information Available







Referrals







 Refer to   Reason for Referral  Status  Appt Date

 

 Fiona Tomlin MD  L4-5 moderate central canal stenosis,  Created  









 [address] [place] [phone]

## 2020-02-26 NOTE — XMS REPORT
Continuity of Care Document (CCD)

 Created on:2020



Patient:Melinda Geller

Sex:Female

:1945

External Reference #:[id number]





Demographics







 Address  [address] [place] [address]

 

 Home Phone  [phone]

 

 Email Address  [email or web address]

 

 Preferred Language  en

 

 Marital Status  Not  or 

 

 Shinto Affiliation  Unknown

 

 Race  White

 

 Ethnic Group  Not  or 









Author







 Name  Abhishek Nela NP (transmitted by agent of provider Glendy Mas)

 

 Address  [address] [place] [address], [address]



   [place] [address]









Care Team Providers







 Name  Role  Phone

 

 Collin Carbajal MD - Family Medicine  Care Team Information   [phone]
[phone]









Problems







 Active Problems  Provider  Date

 

 Carpal tunnel syndrome  Yaquelin Nielson M.D.  Onset: 2015

 

 Degenerative cervical spinal stenosis  Yaquelin Nielson M.D.  Onset: 2015









 Note: with degenerative disc disease









 Restless legs  Yaquelin Nielson M.D.  Onset: 2015

 

 Tension-type headache  Yaquelin Nielson M.D.  Onset: 2015

 

 Pulmonary embolism  Marika Wilder MD  Onset: 2016







Social History







 Type  Date  Description  Comments

 

 Birth Sex    Unknown  

 

 ETOH Use    1 drink a day (wine or  



     beer)  

 

 Tobacco Use  Start: Unknown  Patient has never  



     smoked  

 

 Recreational Drug Use    Denies Drug Use  

 

 Smoking Status  Reviewed: 20  Patient has never  



     smoked  

 

 Exercise Type/Frequency    Exercises regularly  Pool exercises



       3days/week







Allergies, Adverse Reactions, Alerts







 Active Allergies  Reaction  Severity  Comments  Date

 

 Sulfa Antibiotics      Mouth inflammation  2016









 Inactive Allergies









 NKDA        2011







Medications







 Active Medications  SIG  Qnty  Indications  Ordering Provider  Date

 

 Fluoxetine HCL  1 po qd  30caps    Unknown  



              10mg          



 Capsules          



           

 

 Vitamin C  1 by mouth every      Unknown  



         500mg  day        



 Chewtabs          



           

 

 Iron  1 by mouth every      Unknown  



    240(27Fe) mg  day        



 Tablets          



           

 

 Calcium 500+D  1 by mouth every      Unknown  



   day        



 500-200mg-Unit          



 Tablets          



           

 

 Vitamin D  1 by mouth every      Unknown  



         2000Unit  day        



 Tablets          



           

 

 Vitamin K2  1 by mouth every      Unknown  



          100mcg  day        



 Capsules          



           

 

 Vitamin B-Complex  1 by mouth every      Unknown  



   day        



 Tablets          



           

 

 Gabapentin  1 capsule three      Unknown  



          100mg  times daily.        



 Capsules          



           

 

 Advil  10-12 pills daily      Unknown  



     200mg Tablets  as needed        



           









 History Medications









 Vitamin D  1 by mouth every  90tabs    Giorgiorey Mane,  2019 -



 (Cholecalciferol)  other day      MD  2020



              25mcg (1000          



 Ut) Tablets          



           

 

 Cyclobenzaprine HCL  take 1 tab by mouth  90tabs    Giorgio Mane,  2019 
-



                10mg  2 times a day as      MD  2020



 Tablets  needed        

 

 Suprep Bowel Prep Kit  take according to  354ml    Lamine PEREZ  10/11/2019 -



   the instructions      MD Luis Felipe  2019



 17.5-3.13-1.6GM/177ML  you received, the        



 Solution  afternoon before        



   and morning of your        



   procedure.        







Medications Administered in Office







 Medication  SIG  Qnty  Indications  Ordering Provider  Date

 

 Depomedrol 40MG        Dg Curran MD  2016



       Injection          



           

 

 Depomedrol 80MG        Red Zeng M.D.  2014



       Injection          



           

 

 Depomedrol 80MG        Red Zeng M.D.  2011



       Injection          



           







Immunizations







 CPT Code  Status  Date  Vaccine  Lot #

 

   Given  2016  Fluvirin Im 3Yrs And Older  







Vital Signs







 Date  Vital  Result  Comment

 

 2020  1:55pm  Height  66 inches  5'6"









 Weight  142.00 lb  

 

 Heart Rate  72 /min  

 

 BP Systolic  128 mmHg  

 

 BP Diastolic  64 mmHg  

 

 BMI (Body Mass Index)  22.9 kg/m2  









 2020  1:00pm  Height  66 inches  5'6"









 Weight  146.00 lb  

 

 Heart Rate  89 /min  

 

 Respiratory Rate  14 /min  

 

 Body Temperature  97.4 F  

 

 Pain Level  6  

 

 BMI (Body Mass Index)  23.6 kg/m2  







Results







 Description

 

 No Information Available







Procedures







 Description

 

 No Information Available







Medical Devices







 Description

 

 No Information Available







Encounters







 Type  Date  Location  Provider  Dx  Diagnosis

 

 Office Visit  2020  [facility] [place],  S32.501A  Unsp 
fracture of



   1:00p  at [place]  MD    right pubis,



           init for clos fx

 

 [facility]  S32.[facility]  Unsp 
fracture of



   2:00p  at [place]  MD    right pubis,



           init for clos fx

 

 Office Visit  2019  [facility] [place],  S32.501A  Unsp 
fracture of



   2:00p  at [place]  MD    right pubis,



           init for clos fx

 

 [facility]  S32.501A  Unsp 
fracture of



   10:53a  at [place]  MD    right pubis,



           init for clos fx









 W19.xxxA  Unspecified fall, initial encounter







Assessments







 Date  Code  Description  Provider

 

 2020  R29.6  Repeated falls  Abhishek Neal, JOSE

 

 2020  G25.81  Restless legs syndrome  Abhishek Neal NP

 

 2020  M79.2  Neuralgia and neuritis, unspecified  Abhishek Neal NP

 

 2020  S32.501A  Unspecified fracture of right pubis, initial  Giorgio Mane MD



     encounter for closed fracture  

 

 2020  S32.501A  Unspecified fracture of right pubis, initial  Giorgio Mane MD



     encounter for closed fracture  

 

 2019  S32.501A  Unspecified fracture of right pubis, initial  Giorgio Mane MD



     encounter for closed fracture  

 

 2019  S32.501A  Unspecified fracture of right pubis, initial  Giorgio Mane MD



     encounter for closed fracture  

 

 2019  W19.xxxA  Unspecified fall, initial encounter  Giorgio Mane MD







Plan of Treatment

Future Appointment(s):2020  9:30 am - Abhishek Neal NP at [facility]2020 10:00 am - Guanakito Padilla MD at [facility] 
[facility]2020 10:15 am - Lamine Briscoe MD at 
[facility] Geuqzocrbtjikrfy85/05/2020 - Abhishek Neal, NPR29.6 Repeated fallsFollow up:
ONE QELHTT06.81 Restless legs ojmtqtpcN09.2 Neuralgia and neuritis, unspecified



Functional Status







 Functional Condition  Comment  Date  Status

 

 Rolling walker is used to ambulate      Active







Mental Status







 Description

 

 No Information Available







Referrals







 Description

 

 No Information Available

## 2020-02-26 NOTE — XMS REPORT
Continuity of Care Document (CCD)

 Created on:January 3, 2020



Patient:Melinda Geller

Sex:Female

:1945

External Reference #:[id number]





Demographics







 Address  [address] [place] [address]

 

 Home Phone  [phone]

 

 Email Address  [email or web address]

 

 Preferred Language  en

 

 Marital Status  Not  or 

 

 Buddhism Affiliation  Unknown

 

 Race  White

 

 Ethnic Group  Not  or 









Author







 Name  Giorgio Mane MD (transmitted by agent of provider Ana Schwarz)

 

 Address  [address] [place] [id number]









Care Team Providers







 Name  Role  Phone

 

 Collin Carbajal MD - Family Medicine  Care Team Information   [phone]
[phone]









Problems







 Active Problems  Provider  Date

 

 Carpal tunnel syndrome  Yaquelin Nielson M.D.  Onset: 2015

 

 Degenerative cervical spinal stenosis  Yaquelin Nielson M.D.  Onset: 2015









 Note: with degenerative disc disease









 Restless legs  Yaquelin Nielson M.D.  Onset: 2015

 

 Tension-type headache  Yaquelin Nielson M.D.  Onset: 2015

 

 Pulmonary embolism  Marika Wilder MD  Onset: 2016







Social History







 Type  Date  Description  Comments

 

 Birth Sex    Unknown  

 

 ETOH Use    1 drink a day (wine or  



     beer)  

 

 Tobacco Use  Start: Unknown  Patient has never  



     smoked  

 

 Recreational Drug Use    Denies Drug Use  

 

 Smoking Status  Reviewed: 19  Patient has never  



     smoked  

 

 Exercise Type/Frequency    Exercises regularly  Pool exercises



       3days/week







Allergies, Adverse Reactions, Alerts







 Active Allergies  Reaction  Severity  Comments  Date

 

 Sulfa Antibiotics      Mouth inflammation  2016









 Inactive Allergies









 NKDA        2011







Medications







 Active Medications  SIG  Qnty  Indications  Ordering  Date



         Provider  

 

 Vitamin D  1 by mouth every  90tabs    Giorgio Mane,  2019



 (Cholecalciferol)  other day      MD  



               25mcg          



 (1000 Ut) Tablets          



           

 

 Cyclobenzaprine HCL  take 1 tab by  90tabs    Giorgio Mane,  2019



                 10mg  mouth 2 times a      MD  



 Tablets  day as needed        



           

 

 Fluoxetine HCL  1 po qd  30caps    Unknown  



            10mg          



 Capsules          



           

 

 Vitamin C  1 by mouth every      Unknown  



       500mg Chewtabs  day        



           

 

 Iron  1 by mouth every      Unknown  



  240(27Fe) mg Tablets  day        



           

 

 Phytonadione  1 tab by mouth x      Unknown  



          5mg Tablets  1 dose ( pt        



   personal list        



   states 500mg)        

 

 Oxycodone-Acetaminophen  1 tabs by mouth      Unknown  



   every 4-6 hours        



 5-325mg Tablets  as needed for        



   pain        

 

 Naproxen  1 by mouth twice      Unknown  



      250mg Tablets  a day        



           

 

 Lidocaine  use as needed on      Unknown  



       5% Patches  affected area.        



   on for 12 hours        



   then off for 12        



   hours.        









 History Medications









 Suprep Bowel Prep Kit  take according to  354ml    Lamine PEREZ  10/11/2019 -



   the instructions      MD Luis Felipe  2019



 17.5-3.13-1.6GM/177ML  you received, the        



 Solution  afternoon before        



   and morning of your        



   procedure.        







Medications Administered in Office







 Medication  SIG  Qnty  Indications  Ordering Provider  Date

 

 Depomedrol 40MG        Dg Curran MD  2016



       Injection          



           

 

 Depomedrol 80MG        Red Zeng M.D.  2014



       Injection          



           

 

 Depomedrol 80MG        Red Zeng M.D.  2011



       Injection          



           







Immunizations







 CPT Code  Status  Date  Vaccine  Lot #

 

   Given  2016  Fluvirin Im 3Yrs And Older  







Vital Signs







 Date  Vital  Result  Comment

 

 2020  2:30pm  Height  66 inches  5'6"









 Weight  146.00 lb  

 

 Heart Rate  75 /min  

 

 BP Systolic  124 mmHg  

 

 BP Diastolic  60 mmHg  

 

 Respiratory Rate  16 /min  

 

 Body Temperature  98.7 F  

 

 Pain Level  5  

 

 BMI (Body Mass Index)  23.6 kg/m2  









 2019  2:53pm  Height  66 inches  5'6"









 Weight  140.00 lb  

 

 Heart Rate  84 /min  

 

 BP Systolic  124 mmHg  

 

 BP Diastolic  82 mmHg  

 

 Respiratory Rate  16 /min  

 

 Body Temperature  98.8 F  

 

 Pain Level  2  

 

 BMI (Body Mass Index)  22.6 kg/m2  







Results







 Description

 

 No Information Available







Procedures







 Description

 

 No Information Available







Medical Devices







 Description

 

 No Information Available







Encounters







 Type  Date  Location  Provider  Dx  Diagnosis

 

 Office Visit  2019  [facility] [place],  S32.501A  Unsp 
fracture of



   2:00p  at [place]    right pubis,



           init for clos fx

 

 Office Visit  2019  [facility] [place],  S32.501A  Unsp 
fracture of



   10:53a  at [place]  MD    right pubis,



           init for clos fx









 W19.xxxA  Unspecified fall, initial encounter







Assessments







 Date  Code  Description  Provider

 

 2020  S32.501A  Unspecified fracture of right pubis, initial  Giorgio Mane MD



     encounter for closed fracture  

 

 2019  S32.501A  Unspecified fracture of right pubis, initial  Giorgio Mane MD



     encounter for closed fracture  

 

 2019  S32.501A  Unspecified fracture of right pubis, initial  Giorgio Mane MD



     encounter for closed fracture  

 

 2019  W19.xxxA  Unspecified fall, initial encounter  Giorgio Mane MD







Plan of Treatment

Future Appointment(s):2020 10:15 am - Lamine Briscoe MD at [facility] 
Thkzogqdsjxzslbq34/14/2020  3:00 pm - Ismael Patel M.D. at [place] 
[facility]2020 - ALEXANDR Jerome32.501A Unspecified 
fracture of right pubis, initial encounter for closed fractureFollow up:Follow 
Up:   As needed



Functional Status







 Functional Condition  Comment  Date  Status

 

 Rolling walker is used to ambulate      Active







Mental Status







 Description

 

 No Information Available







Referrals







 Description

 

 No Information Available

## 2020-02-26 NOTE — XMS REPORT
Continuity of Care Document (CCD)

 Created on:2020



Patient:Melinda Geller

Sex:Female

:1945

External Reference #:[id number]





Demographics







 Address  [address] [place] [address]

 

 Home Phone  [phone]

 

 Email Address  [email or web address]

 

 Preferred Language  en

 

 Marital Status  Not  or 

 

 Mandaeism Affiliation  Unknown

 

 Race  White

 

 Ethnic Group  Not  or 









Author







 Name  Giorgio Mane MD (transmitted by agent of provider Niki Klein)

 

 Address  [address] [place] [phone]









Care Team Providers







 Name  Role  Phone

 

 Collin Carbajal MD - Family Medicine  Care Team Information   [phone]
[phone]









Problems







 Active Problems  Provider  Date

 

 Carpal tunnel syndrome  Yaquelin Nielson M.D.  Onset: 2015

 

 Degenerative cervical spinal stenosis  Yaquelin Nielson M.D.  Onset: 2015









 Note: with degenerative disc disease









 Restless legs  Yaquelin Nielson M.D.  Onset: 2015

 

 Tension-type headache  Yaquelin Nielson M.D.  Onset: 2015

 

 Pulmonary embolism  Marika Wilder MD  Onset: 2016







Social History







 Type  Date  Description  Comments

 

 Birth Sex    Unknown  

 

 ETOH Use    1 drink a day (wine or  



     beer)  

 

 Tobacco Use  Start: Unknown  Patient has never  



     smoked  

 

 Recreational Drug Use    Denies Drug Use  

 

 Smoking Status  Reviewed: 20  Patient has never  



     smoked  

 

 Exercise Type/Frequency    Exercises regularly  Pool exercises



       3days/week







Allergies, Adverse Reactions, Alerts







 Active Allergies  Reaction  Severity  Comments  Date

 

 Sulfa Antibiotics      Mouth inflammation  2016









 Inactive Allergies









 NKDA        2011







Medications







 Active Medications  SIG  Qnty  Indications  Ordering  Date



         Provider  

 

 Vitamin D  1 by mouth every  90tabs    Giorgio Mane,  2019



 (Cholecalciferol)  other day      MD  



               25mcg          



 (1000 Ut) Tablets          



           

 

 Cyclobenzaprine HCL  take 1 tab by  90tabs    Giorgio Mane,  2019



                 10mg  mouth 2 times a      MD  



 Tablets  day as needed        



           

 

 Fluoxetine HCL  1 po qd  30caps    Unknown  



            10mg          



 Capsules          



           

 

 Vitamin C  1 by mouth every      Unknown  



       500mg Chewtabs  day        



           

 

 Iron  1 by mouth every      Unknown  



  240(27Fe) mg Tablets  day        



           

 

 Phytonadione  1 tab by mouth x      Unknown  



          5mg Tablets  1 dose ( pt        



   personal list        



   states 500mg)        

 

 Oxycodone-Acetaminophen  1 tabs by mouth      Unknown  



   every 4-6 hours        



 5-325mg Tablets  as needed for        



   pain        

 

 Naproxen  1 by mouth twice      Unknown  



      250mg Tablets  a day        



           

 

 Lidocaine  use as needed on      Unknown  



       5% Patches  affected area.        



   on for 12 hours        



   then off for 12        



   hours.        









 History Medications









 Suprep Bowel Prep Kit  take according to  354ml    Lamine PEREZ  10/11/2019 -



   the instructions      MD Luis Felipe  2019



 17.5-3.13-1.6GM/177ML  you received, the        



 Solution  afternoon before        



   and morning of your        



   procedure.        







Medications Administered in Office







 Medication  SIG  Qnty  Indications  Ordering Provider  Date

 

 Depomedrol 40MG        Dg Curran MD  2016



       Injection          



           

 

 Depomedrol 80MG        Red Zeng M.D.  2014



       Injection          



           

 

 Depomedrol 80MG        Red Zeng M.D.  2011



       Injection          



           







Immunizations







 CPT Code  Status  Date  Vaccine  Lot #

 

   Given  2016  Fluvirin Im 3Yrs And Older  







Vital Signs







 Date  Vital  Result  Comment

 

 2020  1:00pm  Height  66 inches  5'6"









 Weight  146.00 lb  

 

 Heart Rate  89 /min  

 

 Respiratory Rate  14 /min  

 

 Body Temperature  97.4 F  

 

 Pain Level  6  

 

 BMI (Body Mass Index)  23.6 kg/m2  









 2020  2:30pm  Height  66 inches  5'6"









 Weight  146.00 lb  

 

 Heart Rate  75 /min  

 

 BP Systolic  124 mmHg  

 

 BP Diastolic  60 mmHg  

 

 Respiratory Rate  16 /min  

 

 Body Temperature  98.7 F  

 

 Pain Level  5  

 

 BMI (Body Mass Index)  23.6 kg/m2  







Results







 Description

 

 No Information Available







Procedures







 Description

 

 No Information Available







Medical Devices







 Description

 

 No Information Available







Encounters







 Type  Date  Location  Provider  Dx  Diagnosis

 

 Office Visit  2020  [facility] [place],  S32.501A  Unsp 
fracture of



   2:00p  at [place]    right pubis,



           init for clos fx

 

 Office Visit  2019  [facility] [place],  S32.501A  Unsp 
fracture of



   2:00p  at [place]  MD    right pubis,



           init for clos fx

 

 Office Visit  2019  [facility]  Giorgio Mane,  S32.501A  Unsp 
fracture of



   10:53a  at [place]  MD    right pubis,



           init for clos fx









 W19.xxxA  Unspecified fall, initial encounter







Assessments







 Date  Code  Description  Provider

 

 2020  S32.501A  Unspecified fracture of right pubis, initial  Giorgio Mane MD



     encounter for closed fracture  

 

 2020  S32.501A  Unspecified fracture of right pubis, initial  Giorgio Mane MD



     encounter for closed fracture  

 

 2019  S32.501A  Unspecified fracture of right pubis, initial  Giorgio Mane MD



     encounter for closed fracture  

 

 2019  S32.501A  Unspecified fracture of right pubis, initial  Giorgio Mane MD



     encounter for closed fracture  

 

 2019  W19.xxxA  Unspecified fall, initial encounter  Giorgio Mane MD







Plan of Treatment

Future Appointment(s):2020  2:00 pm - Abhishek Neal NP at [place] 
[facility]2020 10:00 am - Guanakito Padilla MD at [place] 
[facility]2020 10:15 am - Lamine Briscoe MD at 
[facility] Cwoyfiqtzwyfpoqo13/14/2020  3:00 pm - Ismael Patel M.D. at [place] 
[facility]2020 - ALEXANDR Jerome32.501A Unspecified 
fracture of right pubis, initial encounter for closed fractureFollow up:Follow 
Up:   As needed



Functional Status







 Functional Condition  Comment  Date  Status

 

 Rolling walker is used to ambulate      Active







Mental Status







 Description

 

 No Information Available







Referrals







 Description

 

 No Information Available